# Patient Record
Sex: MALE | Race: BLACK OR AFRICAN AMERICAN | NOT HISPANIC OR LATINO | Employment: UNEMPLOYED | URBAN - METROPOLITAN AREA
[De-identification: names, ages, dates, MRNs, and addresses within clinical notes are randomized per-mention and may not be internally consistent; named-entity substitution may affect disease eponyms.]

---

## 2017-03-17 LAB — CRC RECOMMENDATION EXT: NORMAL

## 2018-05-01 ENCOUNTER — HISTORICAL (OUTPATIENT)
Dept: LAB | Facility: HOSPITAL | Age: 63
End: 2018-05-01

## 2018-05-01 LAB
ABS NEUT (OLG): 5.22 X10(3)/MCL (ref 2.1–9.2)
ALBUMIN SERPL-MCNC: 4.2 GM/DL (ref 3.4–5)
ALBUMIN/GLOB SERPL: 1.2 RATIO (ref 1.1–2)
ALP SERPL-CCNC: 64 UNIT/L (ref 50–136)
ALT SERPL-CCNC: 18 UNIT/L (ref 12–78)
AST SERPL-CCNC: 16 UNIT/L (ref 15–37)
BASOPHILS # BLD AUTO: 0.1 X10(3)/MCL (ref 0–0.2)
BASOPHILS NFR BLD AUTO: 1 %
BILIRUB SERPL-MCNC: 0.5 MG/DL (ref 0.2–1)
BILIRUBIN DIRECT+TOT PNL SERPL-MCNC: 0.1 MG/DL (ref 0–0.5)
BILIRUBIN DIRECT+TOT PNL SERPL-MCNC: 0.4 MG/DL (ref 0–0.8)
BUN SERPL-MCNC: 8 MG/DL (ref 7–18)
CALCIUM SERPL-MCNC: 9.1 MG/DL (ref 8.5–10.1)
CHLORIDE SERPL-SCNC: 102 MMOL/L (ref 98–107)
CHOLEST SERPL-MCNC: 184 MG/DL (ref 0–200)
CHOLEST/HDLC SERPL: 4.1 {RATIO} (ref 0–5)
CO2 SERPL-SCNC: 30 MMOL/L (ref 21–32)
CREAT SERPL-MCNC: 0.95 MG/DL (ref 0.7–1.3)
EOSINOPHIL # BLD AUTO: 0.5 X10(3)/MCL (ref 0–0.9)
EOSINOPHIL NFR BLD AUTO: 6 %
ERYTHROCYTE [DISTWIDTH] IN BLOOD BY AUTOMATED COUNT: 13 % (ref 11.5–17)
GLOBULIN SER-MCNC: 3.5 GM/DL (ref 2.4–3.5)
GLUCOSE SERPL-MCNC: 72 MG/DL (ref 74–106)
HCT VFR BLD AUTO: 48.3 % (ref 42–52)
HDLC SERPL-MCNC: 45 MG/DL (ref 35–60)
HGB BLD-MCNC: 15.7 GM/DL (ref 14–18)
LDLC SERPL CALC-MCNC: 109 MG/DL (ref 0–129)
LYMPHOCYTES # BLD AUTO: 2.8 X10(3)/MCL (ref 0.6–4.6)
LYMPHOCYTES NFR BLD AUTO: 29 %
MCH RBC QN AUTO: 33.1 PG (ref 27–31)
MCHC RBC AUTO-ENTMCNC: 32.5 GM/DL (ref 33–36)
MCV RBC AUTO: 101.9 FL (ref 80–94)
MONOCYTES # BLD AUTO: 0.7 X10(3)/MCL (ref 0.1–1.3)
MONOCYTES NFR BLD AUTO: 8 %
NEUTROPHILS # BLD AUTO: 5.22 X10(3)/MCL (ref 2.1–9.2)
NEUTROPHILS NFR BLD AUTO: 56 %
PLATELET # BLD AUTO: 264 X10(3)/MCL (ref 130–400)
PMV BLD AUTO: 10.1 FL (ref 9.4–12.4)
POTASSIUM SERPL-SCNC: 4.2 MMOL/L (ref 3.5–5.1)
PROT SERPL-MCNC: 7.7 GM/DL (ref 6.4–8.2)
RBC # BLD AUTO: 4.74 X10(6)/MCL (ref 4.7–6.1)
SODIUM SERPL-SCNC: 137 MMOL/L (ref 136–145)
TRIGL SERPL-MCNC: 148 MG/DL (ref 30–150)
VLDLC SERPL CALC-MCNC: 30 MG/DL
WBC # SPEC AUTO: 9.4 X10(3)/MCL (ref 4.5–11.5)

## 2018-06-07 ENCOUNTER — HISTORICAL (OUTPATIENT)
Dept: ADMINISTRATIVE | Facility: HOSPITAL | Age: 63
End: 2018-06-07

## 2018-06-07 LAB — POC CREATININE: 1.1 MG/DL (ref 0.6–1.3)

## 2018-11-24 ENCOUNTER — HISTORICAL (OUTPATIENT)
Dept: ADMINISTRATIVE | Facility: HOSPITAL | Age: 63
End: 2018-11-24

## 2018-11-27 ENCOUNTER — HISTORICAL (OUTPATIENT)
Dept: LAB | Facility: HOSPITAL | Age: 63
End: 2018-11-27

## 2018-11-27 LAB — FINAL CULTURE: NORMAL

## 2018-12-01 LAB — FINAL CULTURE: NORMAL

## 2019-01-02 ENCOUNTER — HOSPITAL ENCOUNTER (OUTPATIENT)
Dept: MEDSURG UNIT | Facility: HOSPITAL | Age: 64
End: 2019-01-04
Attending: INTERNAL MEDICINE | Admitting: INTERNAL MEDICINE

## 2019-01-02 LAB
ABS NEUT (OLG): 12.94 X10(3)/MCL (ref 2.1–9.2)
ALBUMIN SERPL-MCNC: 3.9 GM/DL (ref 3.4–5)
ALBUMIN/GLOB SERPL: 1 RATIO (ref 1–2)
ALP SERPL-CCNC: 54 UNIT/L (ref 45–117)
ALT SERPL-CCNC: 25 UNIT/L (ref 12–78)
AST SERPL-CCNC: 21 UNIT/L (ref 15–37)
BASOPHILS # BLD AUTO: 0.08 X10(3)/MCL
BASOPHILS NFR BLD AUTO: 0 %
BILIRUB SERPL-MCNC: 0.3 MG/DL (ref 0.2–1)
BILIRUBIN DIRECT+TOT PNL SERPL-MCNC: 0.1 MG/DL
BILIRUBIN DIRECT+TOT PNL SERPL-MCNC: 0.2 MG/DL
BUN SERPL-MCNC: 14 MG/DL (ref 7–18)
CALCIUM SERPL-MCNC: 8.7 MG/DL (ref 8.5–10.1)
CHLORIDE SERPL-SCNC: 102 MMOL/L (ref 98–107)
CO2 SERPL-SCNC: 29 MMOL/L (ref 21–32)
CREAT SERPL-MCNC: 1.2 MG/DL (ref 0.6–1.3)
EOSINOPHIL # BLD AUTO: 0.02 X10(3)/MCL
EOSINOPHIL NFR BLD AUTO: 0 %
ERYTHROCYTE [DISTWIDTH] IN BLOOD BY AUTOMATED COUNT: 13.6 % (ref 11.5–14.5)
EST. AVERAGE GLUCOSE BLD GHB EST-MCNC: 146 MG/DL
GLOBULIN SER-MCNC: 4 GM/ML (ref 2.3–3.5)
GLUCOSE SERPL-MCNC: 122 MG/DL (ref 74–106)
HBA1C MFR BLD: 6.7 % (ref 4.2–6.3)
HCT VFR BLD AUTO: 45.4 % (ref 40–51)
HGB BLD-MCNC: 15.3 GM/DL (ref 13.5–17.5)
IMM GRANULOCYTES # BLD AUTO: 0.06 10*3/UL
IMM GRANULOCYTES NFR BLD AUTO: 0 %
LYMPHOCYTES # BLD AUTO: 3.64 X10(3)/MCL
LYMPHOCYTES NFR BLD AUTO: 20 % (ref 13–40)
MAGNESIUM SERPL-MCNC: 2.2 MG/DL (ref 1.8–2.4)
MCH RBC QN AUTO: 33.7 PG (ref 26–34)
MCHC RBC AUTO-ENTMCNC: 33.7 GM/DL (ref 31–37)
MCV RBC AUTO: 100 FL (ref 80–100)
MONOCYTES # BLD AUTO: 1.46 X10(3)/MCL
MONOCYTES NFR BLD AUTO: 8 % (ref 4–12)
NEUTROPHILS # BLD AUTO: 12.94 X10(3)/MCL
NEUTROPHILS NFR BLD AUTO: 71 X10(3)/MCL
PLATELET # BLD AUTO: 259 X10(3)/MCL (ref 130–400)
PMV BLD AUTO: 9.9 FL (ref 7.4–10.4)
POTASSIUM SERPL-SCNC: 3.6 MMOL/L (ref 3.5–5.1)
PROT SERPL-MCNC: 7.9 GM/DL (ref 6.4–8.2)
RBC # BLD AUTO: 4.54 X10(6)/MCL (ref 4.5–5.9)
SODIUM SERPL-SCNC: 141 MMOL/L (ref 136–145)
WBC # SPEC AUTO: 18.2 X10(3)/MCL (ref 4.5–11)

## 2019-01-03 LAB
ABS NEUT (OLG): 9.53 X10(3)/MCL (ref 2.1–9.2)
ALBUMIN SERPL-MCNC: 3.3 GM/DL (ref 3.4–5)
ALBUMIN/GLOB SERPL: 1 RATIO (ref 1–2)
ALP SERPL-CCNC: 47 UNIT/L (ref 45–117)
ALT SERPL-CCNC: 20 UNIT/L (ref 12–78)
AST SERPL-CCNC: 23 UNIT/L (ref 15–37)
BASOPHILS # BLD AUTO: 0.07 X10(3)/MCL
BASOPHILS NFR BLD AUTO: 0 %
BILIRUB SERPL-MCNC: 0.4 MG/DL (ref 0.2–1)
BILIRUBIN DIRECT+TOT PNL SERPL-MCNC: 0.1 MG/DL
BILIRUBIN DIRECT+TOT PNL SERPL-MCNC: 0.3 MG/DL
BUN SERPL-MCNC: 14 MG/DL (ref 7–18)
CALCIUM SERPL-MCNC: 8.2 MG/DL (ref 8.5–10.1)
CHLORIDE SERPL-SCNC: 104 MMOL/L (ref 98–107)
CO2 SERPL-SCNC: 34 MMOL/L (ref 21–32)
CREAT SERPL-MCNC: 0.9 MG/DL (ref 0.6–1.3)
EOSINOPHIL # BLD AUTO: 0.02 10*3/UL
EOSINOPHIL NFR BLD AUTO: 0 %
ERYTHROCYTE [DISTWIDTH] IN BLOOD BY AUTOMATED COUNT: 14 % (ref 11.5–14.5)
GLOBULIN SER-MCNC: 3.6 GM/ML (ref 2.3–3.5)
GLUCOSE SERPL-MCNC: 98 MG/DL (ref 74–106)
HCT VFR BLD AUTO: 42.2 % (ref 40–51)
HGB BLD-MCNC: 13.7 GM/DL (ref 13.5–17.5)
IMM GRANULOCYTES # BLD AUTO: 0.06 10*3/UL
IMM GRANULOCYTES NFR BLD AUTO: 0 %
LYMPHOCYTES # BLD AUTO: 2.16 X10(3)/MCL
LYMPHOCYTES NFR BLD AUTO: 16 % (ref 13–40)
MCH RBC QN AUTO: 33.5 PG (ref 26–34)
MCHC RBC AUTO-ENTMCNC: 32.5 GM/DL (ref 31–37)
MCV RBC AUTO: 103.2 FL (ref 80–100)
MONOCYTES # BLD AUTO: 1.25 X10(3)/MCL
MONOCYTES NFR BLD AUTO: 10 % (ref 4–12)
NEUTROPHILS # BLD AUTO: 9.53 X10(3)/MCL
NEUTROPHILS NFR BLD AUTO: 73 X10(3)/MCL
PLATELET # BLD AUTO: 235 X10(3)/MCL (ref 130–400)
PMV BLD AUTO: 10.2 FL (ref 7.4–10.4)
POTASSIUM SERPL-SCNC: 3.4 MMOL/L (ref 3.5–5.1)
PROT SERPL-MCNC: 6.9 GM/DL (ref 6.4–8.2)
RBC # BLD AUTO: 4.09 X10(6)/MCL (ref 4.5–5.9)
SODIUM SERPL-SCNC: 143 MMOL/L (ref 136–145)
WBC # SPEC AUTO: 13.1 X10(3)/MCL (ref 4.5–11)

## 2019-01-04 LAB
ABS NEUT (OLG): 7.34 X10(3)/MCL (ref 2.1–9.2)
ALBUMIN SERPL-MCNC: 3.2 GM/DL (ref 3.4–5)
ALBUMIN/GLOB SERPL: 1 RATIO (ref 1–2)
ALP SERPL-CCNC: 47 UNIT/L (ref 45–117)
ALT SERPL-CCNC: 24 UNIT/L (ref 12–78)
AST SERPL-CCNC: 20 UNIT/L (ref 15–37)
BASOPHILS # BLD AUTO: 0.01 X10(3)/MCL
BASOPHILS NFR BLD AUTO: 0 %
BILIRUB SERPL-MCNC: 0.4 MG/DL (ref 0.2–1)
BILIRUBIN DIRECT+TOT PNL SERPL-MCNC: 0.2 MG/DL
BILIRUBIN DIRECT+TOT PNL SERPL-MCNC: 0.2 MG/DL
BUN SERPL-MCNC: 13 MG/DL (ref 7–18)
CALCIUM SERPL-MCNC: 8.4 MG/DL (ref 8.5–10.1)
CHLORIDE SERPL-SCNC: 102 MMOL/L (ref 98–107)
CO2 SERPL-SCNC: 36 MMOL/L (ref 21–32)
CREAT SERPL-MCNC: 0.9 MG/DL (ref 0.6–1.3)
ERYTHROCYTE [DISTWIDTH] IN BLOOD BY AUTOMATED COUNT: 13.6 % (ref 11.5–14.5)
GLOBULIN SER-MCNC: 3.6 GM/ML (ref 2.3–3.5)
GLUCOSE SERPL-MCNC: 93 MG/DL (ref 74–106)
HCT VFR BLD AUTO: 41 % (ref 40–51)
HGB BLD-MCNC: 13.5 GM/DL (ref 13.5–17.5)
IMM GRANULOCYTES # BLD AUTO: 0.05 10*3/UL
IMM GRANULOCYTES NFR BLD AUTO: 0 %
LYMPHOCYTES # BLD AUTO: 2.28 X10(3)/MCL
LYMPHOCYTES NFR BLD AUTO: 21 % (ref 13–40)
MCH RBC QN AUTO: 33.4 PG (ref 26–34)
MCHC RBC AUTO-ENTMCNC: 32.9 GM/DL (ref 31–37)
MCV RBC AUTO: 101.5 FL (ref 80–100)
MONOCYTES # BLD AUTO: 0.96 X10(3)/MCL
MONOCYTES NFR BLD AUTO: 9 % (ref 4–12)
NEUTROPHILS # BLD AUTO: 7.34 X10(3)/MCL
NEUTROPHILS NFR BLD AUTO: 69 X10(3)/MCL
PLATELET # BLD AUTO: 221 X10(3)/MCL (ref 130–400)
PMV BLD AUTO: 9.8 FL (ref 7.4–10.4)
POTASSIUM SERPL-SCNC: 3.5 MMOL/L (ref 3.5–5.1)
PROT SERPL-MCNC: 6.8 GM/DL (ref 6.4–8.2)
RBC # BLD AUTO: 4.04 X10(6)/MCL (ref 4.5–5.9)
SODIUM SERPL-SCNC: 142 MMOL/L (ref 136–145)
WBC # SPEC AUTO: 10.6 X10(3)/MCL (ref 4.5–11)

## 2019-04-24 ENCOUNTER — HISTORICAL (OUTPATIENT)
Dept: LAB | Facility: HOSPITAL | Age: 64
End: 2019-04-24

## 2019-04-24 LAB
APPEARANCE, UA: CLEAR
BACTERIA SPEC CULT: ABNORMAL /HPF
BILIRUB UR QL STRIP: NEGATIVE
COLOR UR: YELLOW
CREAT UR-MCNC: 150 MG/DL
GLUCOSE (UA): NEGATIVE
HGB UR QL STRIP: NEGATIVE
KETONES UR QL STRIP: NEGATIVE
LEUKOCYTE ESTERASE UR QL STRIP: ABNORMAL
MICROALBUMIN UR-MCNC: 0.6 MG/DL
MICROALBUMIN/CREAT RATIO PNL UR: 4 MG/GM CR (ref 0–30)
NITRITE UR QL STRIP: NEGATIVE
PH UR STRIP: 7 [PH] (ref 5–9)
PROT UR QL STRIP: NEGATIVE
RBC #/AREA URNS HPF: ABNORMAL /[HPF]
SP GR UR STRIP: 1.02 (ref 1–1.03)
SQUAMOUS EPITHELIAL, UA: ABNORMAL
UROBILINOGEN UR STRIP-ACNC: 1
WBC #/AREA URNS HPF: 10 /HPF (ref 0–3)

## 2019-04-27 LAB — FINAL CULTURE: NO GROWTH

## 2022-04-10 ENCOUNTER — HISTORICAL (OUTPATIENT)
Dept: ADMINISTRATIVE | Facility: HOSPITAL | Age: 67
End: 2022-04-10
Payer: MEDICARE

## 2022-04-29 NOTE — ED PROVIDER NOTES
Patient:   Jemal Mcelroy            MRN: [id number]            FIN: [id number]               Age:   63 years     Sex:  Male     :  1955   Associated Diagnoses:   COPD exacerbation   Author:   Yogesh Farias MD      Basic Information   Time seen: Date & time 2019 03:10:00.   History source: Patient.   Arrival mode: Ambulance.   History limitation: None.   Additional information: Chief Complaint from Nursing Triage Note : Chief Complaint   2019 3:07 CST        Chief Complaint           SOB. Seen in ED yesterday, same c/o.  .      History of Present Illness   The patient presents with difficulty breathing and wheezing.  The onset was 1  weeks ago.  The course/duration of symptoms is constant.  Degree at onset moderate.  Degree at present moderate.  The Exacerbating factors is smoke exposure.  The Relieving factors is rest.  Risk factors consist of chronic obstructive pulmonary disease.  Prior episodes: occasional.  Therapy today: beta-agonist Solu-medrol 125mg IV; Duoneb x 1; Albuterol x 1. and emergency medical services.  Associated symptoms: denies chest pain, denies fever, denies chills, denies nausea, denies vomiting and denies abdominal pain.        Review of Systems   Constitutional symptoms:  No fever, no chills.    Respiratory symptoms:  Shortness of breath, cough, No orthopnea,    Cardiovascular symptoms:  No chest pain, no palpitations.    Gastrointestinal symptoms:  No abdominal pain, no nausea, no vomiting.    Neurologic symptoms:  No headache, no dizziness.              Additional review of systems information: All other systems reviewed and otherwise negative.      Health Status   Allergies:    Allergic Reactions (Selected)  No Known Allergies,    Allergies (1) Active Reaction  No Known Allergies None Documented.   Medications:  (Selected)   Inpatient Medications  Ordered  DuoNeb: 9 mL, form: Soln, NEB, Now, first dose 19 3:16:00 CST, stop date 19 3:16:00 CST,  STAT  Prescriptions  Prescribed  Anoro Ellipta 62.5 mcg-25 mcg/inh inhalation powder: 1 puff(s), INH, Daily, # 30 EA, 5 Refill(s), [id number]  Flovent Diskus 250 mcg/inh inhalation powder: 2 puff(s), INH, BID, # 60 EA, 3 Refill(s), [id number]   mg oral tablet: See Instructions, TAKE 1 TABLET BY MOUTH 3 TIMES A DAY AS NEEDED FOR PAIN, # 60 tab(s), eRx: [facility]acy #[id number]  Lyrica 100 mg oral capsule: 100 mg = 1 cap(s), Oral, BID, # 60 cap(s), 2 Refill(s), [id number]  Zithromax Z-Woody 250 mg oral tablet: = 1 packet(s), Oral, As Directed, per package labeling, X 5 day(s), # 6 tab(s), 0 Refill(s)  albuterol 0.083% inhalation solution: 2.5 mg = 3 mL, INH, q6hr, PRN PRN for wheezing, # 75 EA, 11 Refill(s), [id number]  docusate sodium 100 mg oral tablet: 200 mg = 2 cap(s), Oral, Daily, with plenty of water, # 180 cap(s), 3 Refill(s), [id number]  enalapril-hydrochlorothiazide 10 mg-25 mg oral tablet: 1 tab(s), Oral, Daily, # 90 tab(s), 3 Refill(s), [id number]  loratadine 10 mg oral tablet: 10 mg = 1 tab(s), Oral, Daily, # 90 tab(s), 3 Refill(s), [id number]  metoprolol succinate 100 mg oral tablet extended release: 100 mg = 1 tab(s), Oral, Daily, # 90 tab(s), 3 Refill(s), Pharmacy: [facility] #[id number]  omeprazole 20 mg oral DR capsule: 20 mg, Oral, Daily, # 90 cap(s), 3 Refill(s), [id number]  prednisONE 20 mg oral tablet: 40 mg = 2 tab(s), Oral, Daily, with food or milk, X 5 day(s), # 10 tab(s), 0 Refill(s)  Documented Medications  Documented  Misc Prescription: CPAP, 0 Refill(s)  Misc Prescription: tumeric, 0 Refill(s)  Vitamin D3: 2000unit, Daily, 0 Refill(s)  aspirin 81 mg oral Delayed Release (EC) tablet: 81 mg = 1 tab(s), Oral, Daily, # 30 tab(s), 0 Refill(s)  cyanocobalamin 1000 mcg oral tablet: 1,000 mcg = 1 tab(s), Oral, Daily, # 90 tab(s), 0 Refill(s).      Past Medical/  Family/ Social History   Medical history:    Active  Chronic pain (338.2)  HTN (hypertension) ([id number])  Resolved  Headache ([id number]): Onset on 7/1/2012 at 56 years.  Resolved.  COPD (chronic obstructive pulmonary disease) (496):  Resolved.  Lung cancer (162.9):  Resolved.  Apnea, sleep ([id number]):  Resolved.  diabetes ([id number]):  Resolved., Reviewed as documented in chart.   Surgical history:    Colonoscopy ([id number]) on 3/17/2017 at 61 Years.  Esophagogastroduodenoscopy ([id number]) on 3/17/2017 at 61 Years.  Esophagogastroduodenoscopy on 6/17/2014 at 58 Years.  Comments:  6/17/2014 15:24 - Zara Yoder RN  auto-populated from documented surgical case  Biopsy Gastrointestional on 6/17/2014 at 58 Years.  Comments:  6/17/2014 15:24 - Zara Yoder RN  auto-populated from documented surgical case  Lobectomy of lung ([id number]).  Comments:  10/31/2015 14:26 - Caryn GHOTRA, Vanessa LEVINE  Right Upper Lobe  left breast cyst removal., Reviewed as documented in chart.   Family history:    No family history items have been selected or recorded., Reviewed as documented in chart.   Social history: Reviewed as documented in chart.   Problem list:    Active Problems (10)  Chronic pain   Deficient knowledge   History of lobectomy of lung   History of lung cancer   HTN (hypertension)   Obesity   Rectal bleeding   Stage 4 very severe COPD by GOLD classification   Tobacco user   Tremor .      Physical Examination               Vital Signs   Vital Signs   1/2/2019 3:07 CST        Temperature Oral          36.7 DegC                             Temperature Oral (calculated)             98.06 DegF                             Peripheral Pulse Rate     108 bpm  HI                             Respiratory Rate          32 br/min  HI                             SpO2                      95 %                             Oxygen Therapy            Nasal cannula                              Oxygen Flow Rate          4 L/min                             Systolic Blood Pressure   155 mmHg  HI                             Diastolic Blood Pressure  80 mmHg  .      Vital Signs (last 24 hrs)_____  Last Charted___________  Temp Oral     36.7 DegC  (JAN 02 03:07)  Heart Rate Peripheral   H 108bpm  (JAN 02 03:07)  Resp Rate         H 32br/min  (JAN 02 03:07)  SBP      H 155mmHg  (JAN 02 03:07)  DBP      80 mmHg  (JAN 02 03:07)  SpO2      95 %  (JAN 02 03:07).   Measurements   1/2/2019 3:07 CST        Weight Dosing             105 kg                             Weight Measured and Calculated in Lbs     231.48 lb                             Weight Estimated          105 kg  .   Basic Oxygen Information   1/2/2019 3:07 CST        SpO2                      95 %                             Oxygen Therapy            Nasal cannula                             Oxygen Flow Rate          4 L/min  .   General:  Alert, no acute distress.    Skin:  Intact, moist.    Head:  Normocephalic, atraumatic.    Neck:  Supple, trachea midline, no tenderness.    Eye:  Pupils are equal, round and reactive to light, extraocular movements are intact, normal conjunctiva.    Cardiovascular:  Regular rate and rhythm, No murmur, Normal peripheral perfusion.    Respiratory:  Breath sounds are equal, Symmetrical chest wall expansion, Respirations: Tachypneic, Breath sounds: Wheezes present (moderate, expiratory wheezes).    Gastrointestinal:  Soft, Nontender, Non distended, Normal bowel sounds.    Neurological:  Alert and oriented to person, place, time, and situation, No focal neurological deficit observed.    Psychiatric:  Cooperative, appropriate mood & affect.       Medical Decision Making   Documents reviewed:  Emergency department nurses' notes.   Electrocardiogram:  Time 1/2/2019 03:24:00, rate 98, normal sinus rhythm, No ST-T changes, no ectopy, normal NV & QRS intervals, EP Interp.    Results review:  Lab results : Lab View    1/2/2019 3:20 CST        Sodium Lvl                141 mmol/L                             Potassium Lvl             3.6 mmol/L                             Chloride                  102 mmol/L                             CO2                       29 mmol/L                             Calcium Lvl               8.7 mg/dL                             Glucose Lvl               122 mg/dL  HI                             BUN                       14 mg/dL                             Creatinine                1.20 mg/dL                             eGFR-AA                   79 mL/min  LOW                             eGFR-DAMIEN                  65 mL/min  LOW                             Bili Total                0.3 mg/dL                             Bili Direct               0.1 mg/dL                             Bili Indirect             0.2 mg/dL                             AST                       21 unit/L                             ALT                       25 unit/L                             Alk Phos                  54 unit/L                             Total Protein             7.9 gm/dL                             Albumin Lvl               3.9 gm/dL                             Globulin                  4.00 gm/mL  HI                             A/G Ratio                 1 ratio                             WBC                       18.2 x10(3)/mcL  HI                             RBC                       4.54 x10(6)/mcL                             Hgb                       15.3 gm/dL                             Hct                       45.4 %                             Platelet                  259 x10(3)/mcL                             MCV                       100.0 fL                             MCH                       33.7 pg                             MCHC                      33.7 gm/dL                             RDW                       13.6 %                             MPV                       9.9 fL                              Abs Neut                  12.94 x10(3)/mcL  HI                             Neutro Auto               71 x10(3)/mcL  NA                             Lymph Auto                20 %                             Mono Auto                 8 %                             Eos Auto                  0  NA                             Abs Eos                   0.02 x10(3)/mcL  NA                             Basophil Auto             0  NA                             Abs Neutro                12.94 x10(3)/mcL  NA                             Abs Lymph                 3.64 x10(3)/mcL  NA                             Abs Mono                  1.46 x10(3)/mcL  NA                             Abs Baso                  0.08 x10(3)/mcL  NA                             IG%                       0 %  NA                             IG#                       0.0600  NA  .   Chest X-Ray:  Time reported 1/2/2019 04:04:00, no acute disease process, interpretation by Emergency Physician.       Reexamination/ Reevaluation   Time: 1/2/2019 04:32:00 .   Notes: Patient is currently feeling improved.  Patient is saturating 96 and 98% on 4 L of oxygen.  On oxygen, and patient observed for 10-15 minutes, oxygen saturation ranges between 85 and 91%.  Due to persistent hypoxia and continued symptoms despite 2 days of home therapy with oral antibiotics, internal medicine has been consulted for admission.  Patient denies fever or chills.  He is nontoxic appearing.  Patient has leukocytosis with no left shift, likely from steroid treatment.  Discused with medicine on call and they will decide if they will begin antibx..      Impression and Plan   Diagnosis   COPD exacerbation ([id number] J44.1)      Calls-Consults   -  1/2/2019 04:30:00 , Internal Medicine, consult.    Plan   Condition: Improved, Stable.    Disposition: Admit time  1/2/2019 04:32:00, Place in Observation Telemetry Unit.    Counseled: Patient, Family, Regarding  diagnosis, Regarding diagnostic results, Regarding treatment plan, Regarding prescription, Patient indicated understanding of instructions.

## 2022-04-29 NOTE — DISCHARGE SUMMARY
Patient:   Jemal Mcelroy            MRN: [id number]            FIN: [id number]               Age:   63 years     Sex:  Male     :  1955   Associated Diagnoses:   COPD exacerbation   Author:   Suman JOHN, Hermila MOSHER      Discharge Information      Discharge Summary Information   Admit/Discharge Dates   Admit Date: 2019  Discharge Date: 2019   Physicians   Attending Physician - Keren JOHN, Sukhi Kelley  Admitting Physician - Keren JOHN, Sukhi Kelley  Consulting Physician - Keren JOHN, Sukhi Kelley  Consulting Physician - Michelle JOHN, Brian HENDERSON  Primary Care Physician - Maria Elena JOHN, Angelica LAUGHLIN   Discharge Diagnosis   Chronic obstructive pulmonary disease with (acute) exacerbation (J44.1)       Hospital Course   Hospital Course   63-year-old -American male with COPD, tobacco use, hypertension, diabetes type II, acid reflux and intention tremors brought into the ED via EMS due to acute shortness of breath. Shortness of breath began at bedtime on yesterday with associated dizziness, lightheadedness, diffuse chest discomfort, nausea and chills. He also experienced 1 episode of vomiting after eating on yesterday. Wife and bedside stated they recently had to family members over for the holidays smoke. Patient believes his shortness of breath worsened due to family member smoking in the home and smoke from cooking in the home due to the holidays. Wife and patient smoke indoors. Has also had ongoing cough productive of yellowish sputum for the last several weeks; total of half a cup of sputum. Not currently on home oxygen. Recently seen in the ED on 2018 due to cough; was discharged on steroids and a Z-Woody. Patient also mentioned he's had ongoing bloody stools for the last 3 years. Reports blood mainly is in the toilet more so than on his stool;had a workup with GI regarding this matter but is unsure of what is causing bloody stool. Prior to arrival in the ER he was given 1 dose of Solu-Medrol 125  mg and to duo nebs. In the ER he was given duo nebs ×3, labs significant for elevated white count of 18,000 without shift, chest x-ray final read pending. Medicine on-call consulted for COPD exacerbation. Patient admitted to telemetry for COPD exacerbation. He was treated with SoluMedrol 125mg IV daily, DuoNebs q4h and Levaquin 750mg daily. The patient improved and on Day 3 walking O2 sat done with PT.  Patient's O2 sat 84% walking with PT and 88% at rest with no O2. With 2L O2 patient's O2 sat >90%. Home O2 ordered for [facility] and he was approved. Patient was discharged with home O2 2L as well as Levaquin. Instructed to continue home inhalers. Patient treated with Lovenox for DVT prophylaxis..              Physical Examination   Vital Signs   1/4/2019 7:41 CST        Peripheral Pulse Rate     90 bpm                             Respiratory Rate          18 br/min                             FIO2                      28 %                             SpO2                      97 %                             Oxygen Therapy            Nasal cannula                             Oxygen Flow Rate          2 L/min    1/4/2019 7:00 CST        Temperature Oral          36.7 DegC                             Temperature Oral (calculated)             98.06 DegF                             Systolic Blood Pressure   140 mmHg                             Diastolic Blood Pressure  91 mmHg  HI                             Mean Arterial Pressure, Cuff              107 mmHg     General:  Alert and oriented, No acute distress.    Eye:  Extraocular movements are intact.    HENT:  Normocephalic, Normal hearing, Oral mucosa is moist.    Respiratory:  Respirations are non-labored, Breath sounds are equal, Symmetrical chest wall expansion, Mild wheezing in lower lung fields and prolonged expiratory phase, exam much improved from yesterday.    Cardiovascular:  Normal rate, Regular rhythm, No murmur.    Musculoskeletal:  Normal range of motion.     Integumentary:  Warm, Dry.    Neurologic:  No focal deficits.    Psychiatric:  Cooperative, Appropriate mood & affect.       Results Review   General results   Most recent results   All   1/4/2019 6:25 CST        WBC                       10.6 x10(3)/mcL                             RBC                       4.04 x10(6)/mcL  LOW                             Hgb                       13.5 gm/dL                             Hct                       41.0 %                             Platelet                  221 x10(3)/mcL                             MCV                       101.5 fL  HI                             MCH                       33.4 pg                             MCHC                      32.9 gm/dL                             RDW                       13.6 %                             MPV                       9.8 fL                             Abs Neut                  7.34 x10(3)/mcL                             Neutro Auto               69 x10(3)/mcL  NA                             Lymph Auto                21 %                             Mono Auto                 9 %                             Basophil Auto             0  NA                             Abs Neutro                7.34 x10(3)/mcL  NA                             Abs Lymph                 2.28 x10(3)/mcL  NA                             Abs Mono                  0.96 x10(3)/mcL  NA                             Abs Baso                  0.01 x10(3)/mcL  NA                             IG%                       0 %  NA                             IG#                       0.0500  NA                             Sodium Lvl                142 mmol/L                             Potassium Lvl             3.5 mmol/L                             Chloride                  102 mmol/L                             CO2                       36 mmol/L  HI                             Calcium Lvl               8.4 mg/dL  LOW                              Glucose Lvl               93 mg/dL                             BUN                       13 mg/dL                             Creatinine                0.90 mg/dL                             eGFR-AA                   >105 mL/min                             eGFR-DAMIEN                  91 mL/min                             Bili Total                0.4 mg/dL                             Bili Direct               0.2 mg/dL                             Bili Indirect             0.2 mg/dL                             AST                       20 unit/L                             ALT                       24 unit/L                             Alk Phos                  47 unit/L                             Total Protein             6.8 gm/dL                             Albumin Lvl               3.2 gm/dL  LOW                             Globulin                  3.60 gm/mL  HI                             A/G Ratio                 1 ratio           Discharge Plan   Discharge Summary Plan   Discharge disposition: discharge to home self care.     Prescriptions: reviewed with patient, written and given to patient.     Orders     Orders (Selected)   Prescriptions  Prescribed  Anoro Ellipta 62.5 mcg-25 mcg/inh inhalation powder: 1 puff(s), INH, Daily, # 30 EA, 0 Refill(s)  Flovent Diskus 250 mcg/inh inhalation powder: 2 puff(s), INH, BID, # 60 EA, 0 Refill(s)  Levaquin 750 mg oral tablet: 750 mg = 1 tab(s), Oral, q24hr, X 7 day(s), # 7 tab(s), 0 Refill(s)  albuterol 0.083% inhalation solution: 2.5 mg = 3 mL, INH, q6hr, PRN PRN for wheezing, # 75 EA, 0 Refill(s)  prednisONE 20 mg oral tablet: 40 mg = 2 tab(s), Oral, Daily, with food or milk, X 5 day(s), # 10 tab(s), 0 Refill(s).        Diagnosis     COPD exacerbation ([id number] J44.1).     Course   Improving.     Education and Follow-up   Counseled: patient, regarding diagnosis, regarding treatment, regarding medications.     Discharge Planning: Diabetes Mellitus and Standards of  Medical Care, Diabetes Mellitus and Skin Care, Diabetes Mellitus and Sick Day Management, Diabetes Mellitus and Food, Hypertension, Chronic Obstructive Pulmonary Disease Exacerbation, Easy-to-Read, Angelica Arredondo Within 3 to 5 days Follow-up with PCP in 3 to 5 days; Anytime the conditions worsen, return to clinic or go to ED.

## 2022-04-30 VITALS
WEIGHT: 218.25 LBS | HEIGHT: 69 IN | BODY MASS INDEX: 28.28 KG/M2 | DIASTOLIC BLOOD PRESSURE: 80 MMHG | OXYGEN SATURATION: 95 % | SYSTOLIC BLOOD PRESSURE: 117 MMHG | SYSTOLIC BLOOD PRESSURE: 131 MMHG | HEIGHT: 69 IN | WEIGHT: 190.94 LBS | BODY MASS INDEX: 32.33 KG/M2 | DIASTOLIC BLOOD PRESSURE: 89 MMHG

## 2022-05-02 NOTE — HISTORICAL [facility] CERNER
This is a historical note converted from [employer]. Formatting and pictures may have been removed.  Please reference [employer]ad for original formatting and attached multimedia. Chief Complaint  SOB. Seen in ED yesterday, same c/o.  History of Present Illness  63-year-old -American male with COPD,?tobacco use, hypertension,?diabetes type II, acid reflux and intention?tremors?brought into the ED via EMS due to acute?shortness of breath.? Shortness of breath began at bedtime on yesterday?with associated dizziness, lightheadedness, diffuse chest discomfort, nausea?and chills.??He also experienced 1 episode of vomiting after eating on yesterday.? Wife and bedside stated they recently had to family members over for the holidays?smoke. ?Patient believes?his shortness of breath worsened?due to family member smoking in the home?and smoke from cooking in the home due to the holidays. ?Wife and patient smoke indoors. ?Has also had?ongoing?cough?productive of yellowish sputum?for the last several weeks; total of?half a cup?of sputum.? Not currently on home oxygen. ?Recently seen in the ED on 12/31/2018 due to?cough; was discharged on?steroids and a Z-Woody. ?Patient also mentioned?hes had ongoing?bloody stools for the last 3 years.? Reports blood mainly is in the toilet more so than on his stool;had a?workup?with GI?regarding this matter?but is unsure?of what is causing?bloody stool.? Prior to arrival in the ER he was given?1 dose of Solu-Medrol 125 mg?and to duo nebs. ?In the ER he was given?duo nebs ?3,?labs significant for elevated?white count of 18,000?without shift,?chest x-ray?final read pending.? Medicine on-call consulted for COPD exacerbation.  Review of Systems  Negative except HPI  Physical Exam  Vitals & Measurements  T:?36.6? ?C (Oral)? TMIN:?36.6? ?C (Oral)? TMAX:?36.7? ?C (Oral)? HR:?102(Monitored)? RR:?24? BP:?129/75? SpO2:?95%? WT:?105?kg? WT:?105?kg?  Appearance: alert,?mild?acute distress, not ill  appearing,  Skin: skin normal color and temperature. No pallor, rashes, or lesions.??  Heart: RRR,?no murmurs or rubs noted, 2+ radial pulses  Lungs: Diffuse B/L?expiratory wheezing?with diminished breath sounds?posteriorly,?bilateral chest rise and fall  Head: Atraumatic  Eyes: PERRLA, EOMI, No pallor or icterus  Ears: no hearing abnormalities  Throat: oral mucosa moist  Neck: no JVD, no carotid bruit  Abdomen: BS normal, nontender, distended, no masses  MSK: Normal ROM, no erythema or tenderness  Neurologic: No gross focal neurological deficits  Psychiatric: Cooperative  Assessment/Plan  COPD exacerbation  -Continue Solu-Medrol 125 mg?daily  -Duo nebs every 4?scheduled  -Normal saline at 150 ML/HR  -Started Levaquin 750 mg every 24 hours  -Respiratory culture pending.  -Peak flows?Q8 hours?via respiratory therapy  -Oxygen via nasal cannula to keep?O2 saturations?between 88-92%  ?  Chronic conditions:  Hypertension  -Restarted home dose of metoprolol and?HCTZ-lisinopril  Tobacco use  DM type II  -Last hemoglobin A1c was 6.4?(2015);HbA1c6.7  -Started Low ISS  Intention tremors  Acid reflux  ?  Prophylaxis: Lovenox, Protonix  Diet:?Low sodium with diabetic modification  CODE STATUS: Full code  Disposition: Admitted to remote telemetry unit for?COPD exacerbation,?continue duo nebs and Solu-Medrol daily.? Peak flows were ordered?every 8 hours; respiratory culture pending.?Continue?antibiotic therapy?for respiratory coverage.? Hemoglobin A1c 6.7, started TRACI. ?Restarted home blood pressure medications;?holding off on Lyrica.  ?  Mireya Stevens MD  \[facility]\"" Family Medicine resident, -1?  ?   Problem List/Past Medical History  Ongoing  Chronic pain  History of lobectomy of lung  History of lung cancer  HTN (hypertension)  Obesity  Rectal bleeding  Stage 4 very severe COPD by GOLD classification  Tobacco user  Tremor  Historical  Apnea, sleep  COPD (chronic obstructive pulmonary disease)  diabetes  Headache  Lung  cancer  Procedure/Surgical History  Colonoscopy (03/17/2017)  Esophagogastroduodenoscopy (03/17/2017)  Biopsy Gastrointestional (06/17/2014)  Esophagogastroduodenoscopy (06/17/2014)  left breast cyst removal  Lobectomy of lung   Medications  Inpatient  aspirin, 81 mg= 1 tab(s), Oral, Daily  DuoNeb 0.5 mg-2.5 mg/3 mL inhalation solution, 3 mL, NEB, q6hr Resp  hydrochlorothiazide-lisinopril 12.5 mg-10 mg oral tablet, 1 tab(s), Oral, Daily  IVF Normal Saline NS Infusion 1,000 mL, 1000 mL, IV  levofloxacin IVPB ( Levaquin ), 750 mg= 150 mL, IV Piggyback, q24hr  Lovenox, 40 mg= 0.4 mL, Subcutaneous, Daily  Magnesium Sulfate 2gm/50ml IVPB (Premix), 2 gm= 50 mL, IV Piggyback, Now  metoprolol succinate 100 mg oral tablet, extended release, 100 mg, Oral, Daily  Protonix, 40 mg= 1 EA, IV Slow, Daily  SOLU-Medrol, 125 mg= 2 mL, IV Push, Daily  Zofran  Zofran, 4 mg= 2 mL, IV Push, q4hr, PRN  Home  albuterol 0.083% inhalation solution, 2.5 mg= 3 mL, INH, q6hr, PRN, 11 refills  Anoro Ellipta 62.5 mcg-25 mcg/inh inhalation powder, 1 puff(s), INH, Daily, 5 refills  aspirin 81 mg oral Delayed Release (EC) tablet, 81 mg= 1 tab(s), Oral, Daily  cyanocobalamin 1000 mcg oral tablet, 1000 mcg= 1 tab(s), Oral, Daily  docusate sodium 100 mg oral tablet, 200 mg= 2 cap(s), Oral, Daily, 3 refills  enalapril-hydrochlorothiazide 10 mg-25 mg oral tablet, 1 tab(s), Oral, Daily, 3 refills   mg oral tablet, See Instructions  loratadine 10 mg oral tablet, 10 mg= 1 tab(s), Oral, Daily, 3 refills  Lyrica 100 mg oral capsule, 100 mg= 1 cap(s), Oral, BID, 2 refills  metoprolol succinate 100 mg oral tablet extended release, 100 mg= 1 tab(s), Oral, Daily, 3 refills  Misc Prescription, tumeric  omeprazole 20 mg oral DR capsule, 20 mg, Oral, Daily, 3 refills  prednisONE 20 mg oral tablet, 40 mg= 2 tab(s), Oral, Daily  Vitamin D3, 2000unit, Daily  Zithromax Z-Woody 250 mg oral tablet, 1 packet(s), Oral, As Directed  Allergies  No Known Allergies  Social  History  Alcohol - Denies Alcohol Use, 07/01/2012  Current, 1-2 times per year, 06/26/2016  Exercise  Home/Environment - No Risk, 06/17/2014  Lives with Spouse. Living situation: Home/Independent. Home equipment: Respiratory treatments, Walker/Cane., 06/17/2014  Nutrition/Health  Regular, 06/17/2014  Sexual  Sexually active: Yes., 05/01/2018  Substance Abuse - Denies Substance Abuse, 07/01/2012  Current, Marijuana, 1-2 times per week, 06/26/2016  Tobacco - High Risk, 06/17/2014  10 or more cigarettes (1/2 pack or more)/day in last 30 days, Cigarettes, No, 01/02/2019  Immunizations  Vaccine Date Status   influenza virus vaccine, inactivated 11/26/2018 Given   pneumococcal 23-polyvalent vaccine 11/01/2015 Given   Lab Results  Labs Last 24 Hours?  ?Chemistry? Hematology/Coagulation?   Sodium Lvl: 141 mmol/L (01/02/19 03:20:01) WBC:?18.2 x10(3)/mcL?High (01/02/19 03:20:01)   Potassium Lvl: 3.6 mmol/L (01/02/19 03:20:01) RBC: 4.54 x10(6)/mcL (01/02/19 03:20:01)   Chloride: 102 mmol/L (01/02/19 03:20:01) Hgb: 15.3 gm/dL (01/02/19 03:20:01)   CO2: 29 mmol/L (01/02/19 03:20:01) Hct: 45.4 % (01/02/19 03:20:01)   Calcium Lvl: 8.7 mg/dL (01/02/19 03:20:01) Platelet: 259 x10(3)/mcL (01/02/19 03:20:01)   Magnesium Lvl: 2.2 mg/dL (01/02/19 03:20:00) MCV: 100 fL (01/02/19 03:20:01)   Glucose Lvl:?122 mg/dL?High (01/02/19 03:20:01) MCH: 33.7 pg (01/02/19 03:20:01)   EAG:?146 mg/dL?High (01/02/19 03:20:00) MCHC: 33.7 gm/dL (01/02/19 03:20:01)   BUN: 14 mg/dL (01/02/19 03:20:01) RDW: 13.6 % (01/02/19 03:20:01)   Creatinine: 1.2 mg/dL (01/02/19 03:20:01) MPV: 9.9 fL (01/02/19 03:20:01)   eGFR-AA:?79 mL/min?Low (01/02/19 03:20:01) Abs Neut:?12.94 x10(3)/mcL?High (01/02/19 03:20:01)   eGFR-DAMIEN:?65 mL/min?Low (01/02/19 03:20:01) Neutro Auto: 71 x10(3)/mcL (01/02/19 03:20:01)   Bili Total: 0.3 mg/dL (01/02/19 03:20:01) Lymph Auto: 20 % (01/02/19 03:20:01)   Bili Direct: 0.1 mg/dL (01/02/19 03:20:01) Mono Auto: 8 % (01/02/19 03:20:01)    Bili Indirect: 0.2 mg/dL (01/02/19 03:20:01) Eos Auto: 0 (01/02/19 03:20:01)   AST: 21 unit/L (01/02/19 03:20:01) Abs Eos: 0.02 x10(3)/mcL (01/02/19 03:20:01)   ALT: 25 unit/L (01/02/19 03:20:01) Basophil Auto: 0 (01/02/19 03:20:01)   Alk Phos: 54 unit/L (01/02/19 03:20:01) Abs Neutro: 12.94 x10(3)/mcL (01/02/19 03:20:01)   Total Protein: 7.9 gm/dL (01/02/19 03:20:01) Abs Lymph: 3.64 x10(3)/mcL (01/02/19 03:20:01)   Albumin Lvl: 3.9 gm/dL (01/02/19 03:20:01) Abs Mono: 1.46 x10(3)/mcL (01/02/19 03:20:01)   Globulin:?4 gm/mL?High (01/02/19 03:20:01) Abs Baso: 0.08 x10(3)/mcL (01/02/19 03:20:01)   A/G Ratio: 1 ratio (01/02/19 03:20:01) IG%: 0 % (01/02/19 03:20:01)   Hgb A1c:?6.7 %?High (01/02/19 03:20:00) IG#: 0.06 (01/02/19 03:20:01)   ?  ?  Diagnostic Results  ?CXR pending      This is a 63-year-old male with hx of severe COPD (stage IV, FEV1 29%), ANTONY on CPAP, s/o lobectomy (2005) 2/2 mesothelioma, tobacco use, T2DM, HTN who presented with worsening SOB and wheezing x 1 week. Reports has been exacerbated by smoking and second hand smoke around him especially over the holidays. Endorses associated productive cough with yellow sputum as well as intermittent chest pain associated with cough. Denies fever. Patient recently seen and discharged from ED on 12/31 with COPD exacerbation and given a course of prednisone as well as azithromycin. Patient was given Duonebs x 3 in the ED and 125 mg IV Solumedrol. O2 sats were in the upper 90s on 4L nasal cannula. CXR with bilateral hazy opacifications compared with previous exam (per my read). Physical exam significant for bilateral end expiratory wheezing throughout (worse at the lung bases) and moderate abdominal distention without fluid wave. Patient does not appear to be in any acute distress. Patient admitted for COPD exacerbation. Will start on Duonebs q4h, Levaquin 750 mg qd. Peak flow q8h ordered. Respiratory culture obtained. Will resume home BP meds. Hgba1c ordered,  SSI ordered.?  ?   Care transitioned to Dr. Will.  Patient seen this morning. He is a 63yoM with hx of mesothelioma s/p lobectomy, ANTONY, and COPD stage IV. He states he initially came to the ED due to SOB and chest tightness. The patient admits to continued mild SOB and chest tightness. He also admits to cough productive yellow/white sputum. Patient received Solu-Medrol 125mg IV and 2gm of Mg in ED. He states he was previously seen in the ED on 12/31 and given steroids. He felt much better for approximately 1 day but symptoms worsened. The patient also admits to exertional dyspnea and intermittent LE edema. He has ANTONY and is currently on CPAP. On exam he was found to have prolonged expiratory phase with wheezing in lower lobes and decreased breath sounds. Continue nasal cannula with O2 goal of 88-94%.? Continue Levaquin and Duonebs. Will follow up respiratory cultures that was ordered. Continue peak flow. Will order Echo to rule out underlying CHF. Patient has leukocytosis however likely to multiple doses of steroids he received previously. He also is newly diagnosed DM will start low sliding scale. Will follow up respiratory cultures.  Hermila Will MD?HO-III   I have seen the patient with the residents, re assessed the historical, physical, laboratory, and radiologic findings.  I have discussed the case with the residents and made recommendations.

## 2022-05-02 NOTE — HISTORICAL [facility] CERNER
This is a historical note converted from [employer]ner. Formatting and pictures may have been removed.  Please reference [employer]ner for original formatting and attached multimedia. Chief Complaint  Patient is c/o boil on the upper back with drainage x 1week  History of Present Illness  63-year-old black male?with a one-week history of draining?abscess to?left upper back. ?No significant swelling, redness,?or pain. ?Denies fever or chills.  Patients wife has been applying triple antibiotic ointment?and cleaning the area with antibacterial soap?and water.  No prior history of MRSA reported.  Review of Systems  GENERAL: Negative except as stated?in HPI  CV: Negative except as stated in HPI  RESP: Negative except as stated?in HPI  GI: Negative?except as stated in?HPI  : Negative?except as stated in?HPI  SKIN: Negative?except as stated in?HPI  Neuro: Negative?except as stated in?HPI  MS: Negative?except as stated in?HPI  Psych: Negative?except as stated in?HPI  Physical Exam  Vitals & Measurements  T:?36.7? ?C (Oral)? HR:?73(Peripheral)? BP:?131/89?  HT:?175?cm? HT:?175?cm? WT:?99?kg? WT:?99?kg? BMI:?32.33?  Vital Signs reviewed  GENERAL: Awake and alert; no acute distress.  CV: Normal rate and rhythm.? Normal peripheral pulses and perfusion.  RESP: Respirations even and nonlabored. BBS clear to auscultation.  MUSCULOSKELETAL: Full passive and active ROM of all joints. No bony deformities,?joint tenderness or swelling.?Normal gait.  NEURO: Awake, alert and oriented to person, place, time and situation. Normal speech pattern. ?No focal deficits noted.  INTEGUMENTARY: 2 cm area of mild erythema to left upper back. No induration. Small amount of purulent drainage noted.  PSYCH: Appropriate mood and affect; cooperative.  Assessment/Plan  1.?Skin abscess?L02.91  Wound culture obtained. Rx for?Clindamycin 150 mg?every 6 hours ?7 days. ?Keeping area clean and dry; clean with antibacterial soap. ?Moist warm compresses to the area 3-4  times a day. ?Return to clinic as needed. ?To ER for severe pain, increased redness or swelling, fever.  ?  Orders:  clindamycin, 150 mg = 1 cap(s), Oral, q6hr, X 7 day(s), # 28 cap(s), 0 Refill(s), Pharmacy: [facility]/pharmacy #[id number]   Problem List/Past Medical History  Ongoing  Chronic pain  History of lobectomy of lung  History of lung cancer  HTN (hypertension)  Obesity  Rectal bleeding  Stage 4 very severe COPD by GOLD classification  Tobacco user  Tremor  Historical  Apnea, sleep  COPD (chronic obstructive pulmonary disease)  diabetes  Headache  Lung cancer  Procedure/Surgical History  Colonoscopy (03/17/2017)  Esophagogastroduodenoscopy (03/17/2017)  Biopsy Gastrointestional (06/17/2014)  Esophagogastroduodenoscopy (06/17/2014)  left breast cyst removal  Lobectomy of lung   Medications  albuterol 0.083% inhalation solution, 2.5 mg= 3 mL, INH, q6hr, PRN, 11 refills  Anoro Ellipta 62.5 mcg-25 mcg/inh inhalation powder, 1 puff(s), INH, Daily, 5 refills  aspirin 81 mg oral Delayed Release (EC) tablet, 81 mg= 1 tab(s), Oral, Daily  clindamycin 150 mg oral capsule, 150 mg= 1 cap(s), Oral, q6hr  cyanocobalamin 1000 mcg oral tablet, 1000 mcg= 1 tab(s), Oral, Daily  docusate sodium 100 mg oral tablet, 200 mg= 2 cap(s), Oral, Daily, 3 refills  enalapril-hydrochlorothiazide 10 mg-25 mg oral tablet, 1 tab(s), Oral, Daily, 3 refills  ibuprofen 800 mg oral tablet, 800 mg= 1 tab(s), Oral, TID, PRN  loratadine 10 mg oral tablet, 10 mg= 1 tab(s), Oral, Daily, 3 refills  Lyrica 100 mg oral capsule, 100 mg= 1 cap(s), Oral, BID, 2 refills  LYRICA 75 MG CAPSULE  metoprolol succinate 100 mg oral tablet extended release, 100 mg= 1 tab(s), Oral, Daily, 3 refills  Misc Prescription, tumeric  omeprazole 20 mg oral DR capsule, 20 mg, Oral, Daily, 3 refills  Toradol, 30 mg= 1 mL, IM, Once  Vitamin D3, 2000unit, Daily  Allergies  No Known Allergies  Social History  Alcohol - Denies Alcohol Use, 07/01/2012  Current, 1-2 times per year,  06/26/2016  Exercise  Home/Environment - No Risk, 06/17/2014  Lives with Spouse. Living situation: Home/Independent. Home equipment: Respiratory treatments, Walker/Cane., 06/17/2014  Nutrition/Health  Regular, 06/17/2014  Sexual  Sexually active: Yes., 05/01/2018  Substance Abuse - Denies Substance Abuse, 07/01/2012  Current, Marijuana, 1-2 times per week, 06/26/2016  Tobacco - High Risk, 06/17/2014  Current every day smoker, Yes, 11/24/2018  Current every day smoker, 01/18/2018  Immunizations  Vaccine Date Status   pneumococcal 23-polyvalent vaccine 11/01/2015 Given   Health Maintenance  Health Maintenance  ???Pending?(in the next year)  ??? ??OverDue  ??? ? ? ?COPD Maintenance-Spirometry due??and every?  ??? ??Due?  ??? ? ? ?ADL Screening due??11/24/18??and every 1??year(s)  ??? ? ? ?COPD Maintenance-Pulmonary Rehab Education due??11/24/18??and every 1??year(s)  ??? ? ? ?Influenza Vaccine due??11/24/18??and every?  ??? ? ? ?Lung Cancer Screening due??11/24/18??and every 1??year(s)  ??? ? ? ?Tetanus Vaccine due??11/24/18??and every 10??year(s)  ??? ? ? ?Zoster Vaccine due??11/24/18??and every 100??year(s)  ??? ??Due In Future?  ??? ? ? ?Alcohol Misuse Screening not due until??05/01/19??and every 1??year(s)  ??? ? ? ?Smoking Cessation not due until??06/12/19??and every 1??year(s)  ??? ? ? ?COPD Management-COPD Medications Prescribed not due until??08/27/19??and every 1??year(s)  ??? ? ? ?Hypertension Management-BMP not due until??11/02/19??and every 1??year(s)  ??? ? ? ?Aspirin Therapy for CVD Prevention not due until??11/02/19??and every 1??year(s)  ??? ? ? ?COPD Management-Oxygen Assessment not due until??11/02/19??and every 1??year(s)  ???Satisfied?(in the past 1 year)  ??? ??Satisfied?  ??? ? ? ?Alcohol Misuse Screening on??05/01/18.??Satisfied by Tram Gonzalez LPN  ??? ? ? ?Aspirin Therapy for CVD Prevention on??11/02/18.??Satisfied by Olivia GHOTRA, Radha  ??? ? ? ?Blood Pressure Screening  on??11/24/18.??Satisfied by Jillian Aguilar MA  ??? ? ? ?Body Mass Index Check on??11/24/18.??Satisfied by Jillian Aguilar MA  ??? ? ? ?COPD Maintenance-Spirometry on??06/07/18.??Satisfied by Jolanta Vogel  ??? ? ? ?COPD Management-COPD Medications Prescribed on??08/27/18.??Satisfied by Angelica Arredondo MD  ??? ? ? ?COPD Management-Oxygen Assessment on??11/02/18.??Satisfied by Radha Baker RN  ??? ? ? ?Colorectal Screening on??03/17/18.??Satisfied by Nereida Burdick  ??? ? ? ?Depression Screening on??11/24/18.??Satisfied by Jillian Aguilar MA  ??? ? ? ?Diabetes Maintenance-Fasting Lipid Profile on??05/01/18.??Satisfied by Yeison Dudley  ??? ? ? ?Diabetes Screening on??11/02/18.??Satisfied by Ryan Rose Jr.  ??? ? ? ?Hypertension Management-Blood Pressure on??11/24/18.??Satisfied by Jillian Aguilar MA  ??? ? ? ?Hypertension Management-BMP on??11/02/18.??Satisfied by Ryan Rose Jr.  ??? ? ? ?Influenza Vaccine on??11/24/18.??Satisfied by Jillian Aguilar MA  ??? ? ? ?Lipid Screening on??05/01/18.??Satisfied by Yeison Dudley  ??? ? ? ?Obesity Screening on??11/24/18.??Satisfied by Jillian Aguilar MA  ??? ? ? ?Smoking Cessation on??06/12/18.??Satisfied by Tram Gonzalez LPN  ??? ? ? ?Smoking Cessation (Coronary Artery Disease) on??06/12/18.??Satisfied by Tram Gonzalez LPN  ?  ?

## 2023-01-30 ENCOUNTER — DOCUMENTATION ONLY (OUTPATIENT)
Dept: ADMINISTRATIVE | Facility: HOSPITAL | Age: 68
End: 2023-01-30
Payer: MEDICARE

## 2023-02-06 ENCOUNTER — HOSPITAL ENCOUNTER (EMERGENCY)
Facility: HOSPITAL | Age: 68
Discharge: HOME | End: 2023-02-06
Attending: STUDENT IN AN ORGANIZED HEALTH CARE EDUCATION/TRAINING PROGRAM
Payer: MEDICARE

## 2023-02-06 VITALS
BODY MASS INDEX: 30.92 KG/M2 | WEIGHT: 208.75 LBS | TEMPERATURE: 99 F | HEART RATE: 99 BPM | DIASTOLIC BLOOD PRESSURE: 80 MMHG | SYSTOLIC BLOOD PRESSURE: 136 MMHG | HEIGHT: 69 IN | RESPIRATION RATE: 20 BRPM | OXYGEN SATURATION: 98 %

## 2023-02-06 DIAGNOSIS — J44.9 COPD (CHRONIC OBSTRUCTIVE PULMONARY DISEASE): ICD-10-CM

## 2023-02-06 DIAGNOSIS — R05.3 CHRONIC COUGH: Primary | ICD-10-CM

## 2023-02-06 LAB
FLUAV AG UPPER RESP QL IA.RAPID: NOT DETECTED
FLUBV AG UPPER RESP QL IA.RAPID: NOT DETECTED
SARS-COV-2 RNA RESP QL NAA+PROBE: NOT DETECTED
STREP A PCR (OHS): NOT DETECTED

## 2023-02-06 PROCEDURE — 99284 EMERGENCY DEPT VISIT MOD MDM: CPT | Mod: 25

## 2023-02-06 PROCEDURE — 0240U COVID/FLU A&B PCR: CPT | Performed by: NURSE PRACTITIONER

## 2023-02-06 PROCEDURE — 25000242 PHARM REV CODE 250 ALT 637 W/ HCPCS: Performed by: NURSE PRACTITIONER

## 2023-02-06 PROCEDURE — 63600175 PHARM REV CODE 636 W HCPCS: Performed by: NURSE PRACTITIONER

## 2023-02-06 PROCEDURE — 87651 STREP A DNA AMP PROBE: CPT | Performed by: NURSE PRACTITIONER

## 2023-02-06 PROCEDURE — 94640 AIRWAY INHALATION TREATMENT: CPT

## 2023-02-06 RX ORDER — DOXYCYCLINE 100 MG/1
100 CAPSULE ORAL EVERY 12 HOURS
Qty: 20 CAPSULE | Refills: 0 | Status: SHIPPED | OUTPATIENT
Start: 2023-02-06 | End: 2023-02-16

## 2023-02-06 RX ORDER — PREDNISONE 20 MG/1
TABLET ORAL
Qty: 6 TABLET | Refills: 0 | OUTPATIENT
Start: 2023-02-06 | End: 2023-05-15

## 2023-02-06 RX ORDER — IPRATROPIUM BROMIDE AND ALBUTEROL SULFATE 2.5; .5 MG/3ML; MG/3ML
9 SOLUTION RESPIRATORY (INHALATION)
Status: COMPLETED | OUTPATIENT
Start: 2023-02-06 | End: 2023-02-06

## 2023-02-06 RX ORDER — ALBUTEROL SULFATE 90 UG/1
1-2 AEROSOL, METERED RESPIRATORY (INHALATION) EVERY 6 HOURS PRN
Qty: 1 G | Refills: 0 | Status: SHIPPED | OUTPATIENT
Start: 2023-02-06 | End: 2023-03-08

## 2023-02-06 RX ORDER — PREDNISONE 10 MG/1
40 TABLET ORAL
Status: COMPLETED | OUTPATIENT
Start: 2023-02-06 | End: 2023-02-06

## 2023-02-06 RX ADMIN — PREDNISONE 40 MG: 10 TABLET ORAL at 09:02

## 2023-02-06 RX ADMIN — IPRATROPIUM BROMIDE AND ALBUTEROL SULFATE 9 ML: .5; 3 SOLUTION RESPIRATORY (INHALATION) at 08:02

## 2023-02-07 NOTE — ED PROVIDER NOTES
"Encounter Date: 2/6/2023       History     Chief Complaint   Patient presents with    Shortness of Breath     Shortness of breath x2 days. Received Prednisone from urgent care "not helping". Hx of COPD and lung lobectomy due to CA.     The patient presents with increased shortness of breath and coughing.  The onset was 1 week ago.  The course/duration of symptoms is fluctuating in intensity.  Degree at onset mild.  Degree at present mild.  The Relieving factors is none.  Risk factors consist of copd, lobectomy, smoker.  Prior episodes: occasional.  Therapy today: beta-agonist nebulizer, he is also on home O2 at 2L/M.  Associated symptoms: denies chest pain, denies fever, denies chills, denies nausea, denies vomiting and denies abdominal pain. Additional history: he was seen yesterday at an urgent care, he was prescribed prednisone 20mg bid for 3 days and cough medicine, he has a pcp appointment in 1 week on 2/13. He is out of his albuterol inhaler and requests refill.    Review of patient's allergies indicates:  No Known Allergies  Past Medical History:   Diagnosis Date    Cancer     COPD (chronic obstructive pulmonary disease)     Hypertension     Personal history of colonic polyps      Past Surgical History:   Procedure Laterality Date    COLONOSCOPY  03/17/2017     History reviewed. No pertinent family history.  Social History     Tobacco Use    Smoking status: Every Day     Packs/day: 0.50     Types: Cigarettes    Smokeless tobacco: Never   Substance Use Topics    Drug use: Yes     Frequency: 2.0 times per week     Types: Marijuana     Review of Systems   Constitutional:  Negative for fever.   HENT:  Negative for sore throat.    Respiratory:  Positive for cough and shortness of breath.    Cardiovascular:  Negative for chest pain.   Gastrointestinal:  Negative for nausea.   Genitourinary:  Negative for dysuria.   Musculoskeletal:  Negative for back pain.   Skin:  Negative for rash.   Neurological:  Negative for " weakness.   Hematological:  Does not bruise/bleed easily.   All other systems reviewed and are negative.    Physical Exam     Initial Vitals [02/06/23 1846]   BP Pulse Resp Temp SpO2   134/79 103 (!) 24 98.6 °F (37 °C) 96 %      MAP       --         Physical Exam    Nursing note and vitals reviewed.  Constitutional: He appears well-developed and well-nourished.   HENT:   Head: Normocephalic and atraumatic.   Neck: Neck supple.   Normal range of motion.  Cardiovascular:  Normal rate, regular rhythm, normal heart sounds and intact distal pulses.           Pulmonary/Chest: Effort normal. He has decreased breath sounds in the right lower field and the left lower field. He has no wheezes.   Abdominal: Abdomen is soft. Bowel sounds are normal.   Musculoskeletal:         General: Normal range of motion.      Cervical back: Normal range of motion and neck supple.     Neurological: He is alert and oriented to person, place, and time. He has normal strength.   Skin: Skin is warm and dry.   Psychiatric: He has a normal mood and affect.       ED Course   Procedures  Labs Reviewed   STREP GROUP A BY PCR - Normal    Narrative:     The Xpert Xpress Strep A test is a rapid, qualitative in vitro diagnostic test for the detection of Streptococcus pyogenes (Group A ß-hemolytic Streptococcus, Strep A) in throat swab specimens from patients with signs and symptoms of pharyngitis.     COVID/FLU A&B PCR - Normal    Narrative:     The Xpert Xpress SARS-CoV-2/FLU/RSV plus is a rapid, multiplexed real-time PCR test intended for the simultaneous qualitative detection and differentiation of SARS-CoV-2, Influenza A, Influenza B, and respiratory syncytial virus (RSV) viral RNA in either nasopharyngeal swab or nasal swab specimens.                Imaging Results              X-Ray Chest AP Portable (Final result)  Result time 02/06/23 19:39:56      Final result by Jaciel Granados MD (02/06/23 19:39:56)                   Impression:       Findings of chronic lung disease which appears stable.      Electronically signed by: Jaciel Granados  Date:    02/06/2023  Time:    19:39               Narrative:    EXAMINATION:  XR CHEST AP PORTABLE    CLINICAL HISTORY:  Chronic obstructive pulmonary disease, unspecified    TECHNIQUE:  Single view of the chest    COMPARISON:  07/02/2021    FINDINGS:  Prominent interstitial markings with no focal opacification.  No pleural effusion or pneumothorax.    The cardiomediastinal silhouette is within normal limits.    No acute osseous abnormality.                                       Medications   albuterol-ipratropium 2.5 mg-0.5 mg/3 mL nebulizer solution 9 mL (9 mLs Nebulization Given 2/6/23 2002)   predniSONE tablet 40 mg (40 mg Oral Given 2/6/23 2108)     Medical Decision Making:   History:   Old Records Summarized: records from clinic visits and records from previous admission(s).  Clinical Tests:   Radiological Study: Ordered and Reviewed  Re-eval at 2030: feels better after resp tx and wishes to go home, BBS clear, resp even/nonlabored, will add 3 days to currently prescribed prednisone to make total of 6 days of 40mg qd, he has a pcp appointment on 2/13 in 1 week, strict return to ER instructions given    8:43 PM DISPOSITION: The patient is resting comfortably in no acute distress.  He is hemodynamically stable and is without objective evidence for acute process requiring urgent intervention or hospitalization. I provided counseling to patient with regard to condition, the treatment plan, specific conditions for return, and the importance of follow up. Detailed written and verbal instructions provided to patient and he expressed a verbal understanding of the discharge instructions and overall management plan. Reiterated the importance of medication administration and safety and advised patient to follow up with primary care provider in 1 week at scheduled appointment or sooner if needed.  Answered questions at  this time. The patient is stable for discharge.        APC / Resident Notes:   I was not physically present during the history, exam or disposition of this patient. I was available at all times for consultation. (Daniel)                   Clinical Impression:   Final diagnoses:  [J44.9] COPD (chronic obstructive pulmonary disease)  [R05.3] Chronic cough (Primary)        ED Disposition Condition    Discharge Stable          ED Prescriptions       Medication Sig Dispense Start Date End Date Auth. Provider    albuterol (PROVENTIL/VENTOLIN HFA) 90 mcg/actuation inhaler Inhale 1-2 puffs into the lungs every 6 (six) hours as needed for Wheezing. Rescue 1 g 2/6/2023 3/8/2023 IRWIN Mcnair    doxycycline (VIBRAMYCIN) 100 MG Cap Take 1 capsule (100 mg total) by mouth every 12 (twelve) hours. for 10 days 20 capsule 2/6/2023 2/16/2023 IRWIN Mcnair    predniSONE (DELTASONE) 20 MG tablet Continue taking prednisone 20mg tab twice a day, with the addition of this prescription will make total of 6 days 6 tablet 2/6/2023 -- IRWIN Mcnair          Follow-up Information       Follow up With Specialties Details Why Contact Info    follow up at scheduled pcp appointment next week on 2/13        [facility] Emergency Medicine  If symptoms worsen [address] [facility] [address] [phone]             IRWIN Mcnair  02/06/23 2046       Crow Sanchez MD  02/08/23 4134

## 2023-03-15 DIAGNOSIS — Z87.891 HISTORY OF TOBACCO USE: Primary | ICD-10-CM

## 2023-03-20 ENCOUNTER — HOSPITAL ENCOUNTER (EMERGENCY)
Facility: HOSPITAL | Age: 68
Discharge: HOME | End: 2023-03-20
Attending: EMERGENCY MEDICINE
Payer: MEDICARE

## 2023-03-20 VITALS
SYSTOLIC BLOOD PRESSURE: 145 MMHG | RESPIRATION RATE: 16 BRPM | DIASTOLIC BLOOD PRESSURE: 85 MMHG | WEIGHT: 208.31 LBS | BODY MASS INDEX: 30.85 KG/M2 | HEART RATE: 78 BPM | HEIGHT: 69 IN | TEMPERATURE: 99 F | OXYGEN SATURATION: 98 %

## 2023-03-20 DIAGNOSIS — M54.2 NECK PAIN: ICD-10-CM
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
## 2023-03-20 PROCEDURE — 99284 EMERGENCY DEPT VISIT MOD MDM: CPT

## 2023-03-20 RX ORDER — DICLOFENAC SODIUM 75 MG/1
75 TABLET, DELAYED RELEASE ORAL 2 TIMES DAILY PRN
Qty: 20 TABLET | Refills: 0 | OUTPATIENT
Start: 2023-03-20 | End: 2023-05-15

## 2023-03-20 RX ORDER — TIZANIDINE 4 MG/1
4 TABLET ORAL EVERY 6 HOURS PRN
Qty: 20 TABLET | Refills: 0 | OUTPATIENT
Start: 2023-03-20 | End: 2023-05-15
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
## 2023-03-21 ENCOUNTER — TELEPHONE (OUTPATIENT)
Dept: EMERGENCY MEDICINE | Facility: HOSPITAL | Age: 68
End: 2023-03-21
Payer: MEDICARE

## 2023-03-21 NOTE — TELEPHONE ENCOUNTER
Patient contacted states he's feeling good medication prescribed picked up and is helping.  Instructed to contact PCP for follow up appointment.

## 2023-03-28 ENCOUNTER — HOSPITAL ENCOUNTER (OUTPATIENT)
Dept: RADIOLOGY | Facility: HOSPITAL | Age: 68
Discharge: HOME | End: 2023-03-28
Attending: NURSE PRACTITIONER
Payer: MEDICARE

## 2023-03-28 DIAGNOSIS — Z87.891 HISTORY OF TOBACCO USE: ICD-10-CM

## 2023-03-28 PROCEDURE — 71271 CT THORAX LUNG CANCER SCR C-: CPT | Mod: TC

## 2023-05-14 ENCOUNTER — HOSPITAL ENCOUNTER (EMERGENCY)
Facility: HOSPITAL | Age: 68
Discharge: HOME | End: 2023-05-15
Attending: EMERGENCY MEDICINE
Payer: OTHER GOVERNMENT

## 2023-05-14 DIAGNOSIS — I95.1 ORTHOSTATIC HYPOTENSION: Primary | ICD-10-CM

## 2023-05-14 DIAGNOSIS — I95.9 HYPOTENSION: ICD-10-CM

## 2023-05-14 PROCEDURE — 83880 ASSAY OF NATRIURETIC PEPTIDE: CPT | Performed by: EMERGENCY MEDICINE

## 2023-05-14 PROCEDURE — 80053 COMPREHEN METABOLIC PANEL: CPT | Performed by: EMERGENCY MEDICINE

## 2023-05-14 PROCEDURE — 84484 ASSAY OF TROPONIN QUANT: CPT | Performed by: EMERGENCY MEDICINE

## 2023-05-14 PROCEDURE — 83735 ASSAY OF MAGNESIUM: CPT | Performed by: EMERGENCY MEDICINE

## 2023-05-14 PROCEDURE — 85025 COMPLETE CBC W/AUTO DIFF WBC: CPT | Performed by: EMERGENCY MEDICINE

## 2023-05-14 PROCEDURE — 93005 ELECTROCARDIOGRAM TRACING: CPT

## 2023-05-14 PROCEDURE — 99285 EMERGENCY DEPT VISIT HI MDM: CPT | Mod: 25

## 2023-05-15 VITALS
WEIGHT: 213.19 LBS | RESPIRATION RATE: 15 BRPM | HEIGHT: 69 IN | OXYGEN SATURATION: 93 % | BODY MASS INDEX: 31.58 KG/M2 | SYSTOLIC BLOOD PRESSURE: 130 MMHG | DIASTOLIC BLOOD PRESSURE: 83 MMHG | HEART RATE: 64 BPM | TEMPERATURE: 98 F

## 2023-05-15 LAB
ALBUMIN SERPL-MCNC: 3.9 G/DL (ref 3.4–4.8)
ALBUMIN/GLOB SERPL: 1.3 RATIO (ref 1.1–2)
ALP SERPL-CCNC: 52 UNIT/L (ref 40–150)
ALT SERPL-CCNC: 23 UNIT/L (ref 0–55)
APPEARANCE UR: CLEAR
AST SERPL-CCNC: 22 UNIT/L (ref 5–34)
BACTERIA #/AREA URNS AUTO: NORMAL /HPF
BASOPHILS # BLD AUTO: 0.13 X10(3)/MCL
BASOPHILS NFR BLD AUTO: 1.2 %
BILIRUB UR QL STRIP.AUTO: NEGATIVE MG/DL
BILIRUBIN DIRECT+TOT PNL SERPL-MCNC: 0.3 MG/DL
BNP BLD-MCNC: <10 PG/ML
BUN SERPL-MCNC: 12.4 MG/DL (ref 8.4–25.7)
CALCIUM SERPL-MCNC: 9.4 MG/DL (ref 8.8–10)
CHLORIDE SERPL-SCNC: 104 MMOL/L (ref 98–107)
CO2 SERPL-SCNC: 27 MMOL/L (ref 23–31)
COLOR UR: YELLOW
CREAT SERPL-MCNC: 1.15 MG/DL (ref 0.73–1.18)
EOSINOPHIL # BLD AUTO: 0.45 X10(3)/MCL (ref 0–0.9)
EOSINOPHIL NFR BLD AUTO: 4.3 %
ERYTHROCYTE [DISTWIDTH] IN BLOOD BY AUTOMATED COUNT: 13.2 % (ref 11.5–17)
GFR SERPLBLD CREATININE-BSD FMLA CKD-EPI: >60 MLS/MIN/1.73/M2
GLOBULIN SER-MCNC: 3.1 GM/DL (ref 2.4–3.5)
GLUCOSE SERPL-MCNC: 88 MG/DL (ref 82–115)
GLUCOSE UR QL STRIP.AUTO: NEGATIVE MG/DL
HCT VFR BLD AUTO: 46.9 % (ref 42–52)
HGB BLD-MCNC: 15.7 G/DL (ref 14–18)
IMM GRANULOCYTES # BLD AUTO: 0.03 X10(3)/MCL (ref 0–0.04)
IMM GRANULOCYTES NFR BLD AUTO: 0.3 %
KETONES UR QL STRIP.AUTO: NEGATIVE MG/DL
LEUKOCYTE ESTERASE UR QL STRIP.AUTO: NEGATIVE UNIT/L
LYMPHOCYTES # BLD AUTO: 2.94 X10(3)/MCL (ref 0.6–4.6)
LYMPHOCYTES NFR BLD AUTO: 28.2 %
MAGNESIUM SERPL-MCNC: 1.8 MG/DL (ref 1.6–2.6)
MCH RBC QN AUTO: 35.1 PG (ref 27–31)
MCHC RBC AUTO-ENTMCNC: 33.5 G/DL (ref 33–36)
MCV RBC AUTO: 104.9 FL (ref 80–94)
MONOCYTES # BLD AUTO: 0.86 X10(3)/MCL (ref 0.1–1.3)
MONOCYTES NFR BLD AUTO: 8.2 %
NEUTROPHILS # BLD AUTO: 6.03 X10(3)/MCL (ref 2.1–9.2)
NEUTROPHILS NFR BLD AUTO: 57.8 %
NITRITE UR QL STRIP.AUTO: NEGATIVE
NRBC BLD AUTO-RTO: 0 %
PH UR STRIP.AUTO: 5.5 [PH]
PLATELET # BLD AUTO: 212 X10(3)/MCL (ref 130–400)
PMV BLD AUTO: 9.8 FL (ref 7.4–10.4)
POTASSIUM SERPL-SCNC: 4.4 MMOL/L (ref 3.5–5.1)
PROT SERPL-MCNC: 7 GM/DL (ref 5.8–7.6)
PROT UR QL STRIP.AUTO: NEGATIVE MG/DL
RBC # BLD AUTO: 4.47 X10(6)/MCL (ref 4.7–6.1)
RBC #/AREA URNS AUTO: NORMAL /HPF
RBC UR QL AUTO: ABNORMAL UNIT/L
SODIUM SERPL-SCNC: 140 MMOL/L (ref 136–145)
SP GR UR STRIP.AUTO: 1.01 (ref 1–1.03)
SQUAMOUS #/AREA URNS AUTO: NORMAL /HPF
TROPONIN I SERPL-MCNC: <0.01 NG/ML (ref 0–0.04)
UROBILINOGEN UR STRIP-ACNC: 0.2 MG/DL
WBC # SPEC AUTO: 10.44 X10(3)/MCL (ref 4.5–11.5)
WBC #/AREA URNS AUTO: NORMAL /HPF

## 2023-05-15 PROCEDURE — 63600175 PHARM REV CODE 636 W HCPCS: Performed by: EMERGENCY MEDICINE

## 2023-05-15 PROCEDURE — 96360 HYDRATION IV INFUSION INIT: CPT

## 2023-05-15 PROCEDURE — 94640 AIRWAY INHALATION TREATMENT: CPT

## 2023-05-15 PROCEDURE — 94761 N-INVAS EAR/PLS OXIMETRY MLT: CPT

## 2023-05-15 PROCEDURE — 81001 URINALYSIS AUTO W/SCOPE: CPT | Performed by: EMERGENCY MEDICINE

## 2023-05-15 PROCEDURE — 25000242 PHARM REV CODE 250 ALT 637 W/ HCPCS: Performed by: EMERGENCY MEDICINE

## 2023-05-15 RX ORDER — GABAPENTIN 300 MG/1
300 CAPSULE ORAL 3 TIMES DAILY
COMMUNITY

## 2023-05-15 RX ORDER — METOPROLOL SUCCINATE 100 MG/1
100 TABLET, EXTENDED RELEASE ORAL DAILY
COMMUNITY
End: 2023-05-15 | Stop reason: SDUPTHER

## 2023-05-15 RX ORDER — ALBUTEROL SULFATE 0.83 MG/ML
2.5 SOLUTION RESPIRATORY (INHALATION)
Status: COMPLETED | OUTPATIENT
Start: 2023-05-15 | End: 2023-05-15

## 2023-05-15 RX ORDER — METOPROLOL SUCCINATE 50 MG/1
50 TABLET, EXTENDED RELEASE ORAL DAILY
Qty: 30 TABLET | Refills: 0 | Status: SHIPPED | OUTPATIENT
Start: 2023-05-15 | End: 2023-06-14

## 2023-05-15 RX ORDER — ATORVASTATIN CALCIUM 40 MG/1
40 TABLET, FILM COATED ORAL DAILY
COMMUNITY

## 2023-05-15 RX ADMIN — ALBUTEROL SULFATE 2.5 MG: 2.5 SOLUTION RESPIRATORY (INHALATION) at 01:05

## 2023-05-15 RX ADMIN — SODIUM CHLORIDE, POTASSIUM CHLORIDE, SODIUM LACTATE AND CALCIUM CHLORIDE 1000 ML: 600; 310; 30; 20 INJECTION, SOLUTION INTRAVENOUS at 12:05

## 2023-05-15 NOTE — ED PROVIDER NOTES
Encounter Date: 5/14/2023    SCRIBE #1 NOTE: I, Cachorro Burkett, am scribing for, and in the presence of,  Tung Priutt III, MD. I have scribed the following portions of the note - Other sections scribed: HPI, ROS, PE.     History     Chief Complaint   Patient presents with    Hypotension     Patient complaint of intermittent hypotension, headaches, dizziness.  Symptoms started Friday.      67 year old male with pmhx of COPD, HTN, and cancer presents to ED c/o dizziness and headache onset Friday. Pt reports that his dizziness is resent whenever he gets up form a laying or sitting position, denies history of similar symptoms. Pt reports that he has an intermittent dull feeling headache at the front of his head. Per wife, pt had blood pressure of 87/53 after he stood up just PTA, history of COPD, taking 4 breathing treatments per day. Pt reports that he smokes 1 pack per day currently.    The history is provided by the patient. No  was used.   Headache   This is a new problem. Episode onset: friday. The problem occurs intermittently. The problem has been unchanged. The pain is located in the Frontal region. The quality of the pain is described as dull. Associated symptoms include dizziness. Pertinent negatives include no abdominal pain, back pain, coughing, eye pain, fever, nausea, neck pain, rhinorrhea or vomiting.   Review of patient's allergies indicates:   Allergen Reactions    Metformin Diarrhea and Other (See Comments)     Other reaction(s): Other     Past Medical History:   Diagnosis Date    Cancer     COPD (chronic obstructive pulmonary disease)     Hypertension     Personal history of colonic polyps      Past Surgical History:   Procedure Laterality Date    COLONOSCOPY  03/17/2017     No family history on file.  Social History     Tobacco Use    Smoking status: Every Day     Packs/day: 0.50     Types: Cigarettes    Smokeless tobacco: Never   Substance Use Topics    Drug use: Yes      Frequency: 2.0 times per week     Types: Marijuana     Review of Systems   Constitutional:  Negative for fatigue, fever and unexpected weight change.   HENT:  Negative for congestion and rhinorrhea.    Eyes:  Negative for pain.   Respiratory:  Negative for cough, chest tightness, shortness of breath and wheezing.    Cardiovascular:  Negative for chest pain.   Gastrointestinal:  Negative for abdominal pain, constipation, diarrhea, nausea and vomiting.   Genitourinary:  Negative for dysuria.   Musculoskeletal:  Negative for back pain and neck pain.   Skin:  Negative for rash.   Allergic/Immunologic: Negative for environmental allergies, food allergies and immunocompromised state.   Neurological:  Positive for dizziness and headaches. Negative for speech difficulty.   Hematological:  Does not bruise/bleed easily.   Psychiatric/Behavioral:  Negative for sleep disturbance and suicidal ideas.      Physical Exam     Initial Vitals [05/14/23 2320]   BP Pulse Resp Temp SpO2   103/69 75 18 97.9 °F (36.6 °C) 95 %      MAP       --         Physical Exam    Nursing note and vitals reviewed.  Constitutional: He appears well-developed and well-nourished.   Smell of tobacco on breath   HENT:   Head: Normocephalic and atraumatic.   Eyes: EOM are normal. Pupils are equal, round, and reactive to light.   Neck:   Normal range of motion.  Cardiovascular:  Normal rate, regular rhythm, normal heart sounds and intact distal pulses.           Pulmonary/Chest: Breath sounds normal.   Abdominal: Abdomen is soft. Bowel sounds are normal.   Musculoskeletal:         General: Normal range of motion.      Cervical back: Normal range of motion.     Neurological: He is alert and oriented to person, place, and time. He has normal strength. GCS score is 15. GCS eye subscore is 4. GCS verbal subscore is 5. GCS motor subscore is 6.   Skin: Skin is warm and dry. Capillary refill takes less than 2 seconds.   Psychiatric: He has a normal mood and affect.  Judgment normal.       ED Course   Procedures  Labs Reviewed   URINALYSIS, REFLEX TO URINE CULTURE - Abnormal; Notable for the following components:       Result Value    Blood, UA Trace (*)     All other components within normal limits   CBC WITH DIFFERENTIAL - Abnormal; Notable for the following components:    RBC 4.47 (*)     .9 (*)     MCH 35.1 (*)     All other components within normal limits   B-TYPE NATRIURETIC PEPTIDE - Normal   TROPONIN I - Normal   MAGNESIUM - Normal   URINALYSIS, MICROSCOPIC - Normal   CBC W/ AUTO DIFFERENTIAL    Narrative:     The following orders were created for panel order CBC auto differential.  Procedure                               Abnormality         Status                     ---------                               -----------         ------                     CBC with Differential[[id number]]        Abnormal            Final result                 Please view results for these tests on the individual orders.   COMPREHENSIVE METABOLIC PANEL          Imaging Results              X-Ray Chest 1 View (In process)                      Medications   albuterol nebulizer solution 2.5 mg (2.5 mg Nebulization Given 5/15/23 0104)   lactated ringers bolus 1,000 mL (0 mLs Intravenous Stopped 5/15/23 0150)              Scribe Attestation:   Scribe #1: I performed the above scribed service and the documentation accurately describes the services I performed. I attest to the accuracy of the note.    Attending Attestation:           Physician Attestation for Scribe:  Physician Attestation Statement for Scribe #1: I, Tung Pruitt III, MD, reviewed documentation, as scribed by Cachorro Burkett in my presence, and it is both accurate and complete.       Medical Decision Making  Differential diagnosis includes but not limited to anemia renal dysfunction over-medication electrolyte disturbance    No melena no diarrhea CBC normal anemia no longer differential normal electrolytes patient  complaining of weakness and low blood pressure when he gets up in his wife did check his blood pressure at home his pressure was 87 symptoms get better as he lays down patient no recent blood pressure changes.  Patient given small bolus IV fluids orthostatics negative here will adjust blood pressure medications patient without complaints will be discharged    Problems Addressed:  Hypotension: complicated acute illness or injury that poses a threat to life or bodily functions  Orthostatic hypotension: acute illness or injury    Amount and/or Complexity of Data Reviewed  Independent Historian: spouse     Details: Per wife, pt pmhx of COPD and HTN, 4 breathing treatments per day, blood pressure 87/53 after standing up just PTA  Labs: ordered.  Radiology: ordered and independent interpretation performed.           ED Course as of 05/15/23 0240   Mon May 15, 2023   0026 Patient with weakness and dizziness and mild headache only when standing up will get orthostatics check electrolytes assess fluid status neurologically intact [FK]      ED Course User Index  [FK] Tung Pruitt III, MD                 Clinical Impression:   Final diagnoses:  [I95.9] Hypotension  [I95.1] Orthostatic hypotension (Primary)        ED Disposition Condition    Discharge Stable          ED Prescriptions       Medication Sig Dispense Start Date End Date Auth. Provider    metoprolol succinate (TOPROL-XL) 50 MG 24 hr tablet Take 1 tablet (50 mg total) by mouth once daily. 30 tablet 5/15/2023 6/14/2023 Tung Pruitt III, MD          Follow-up Information       Follow up With Specialties Details Why Contact Info    [facility]  In 1 week  [address] [place] [address]  [phone]               Tung Pruitt III, MD  05/15/23 6591

## 2023-06-19 ENCOUNTER — HOSPITAL ENCOUNTER (EMERGENCY)
Facility: HOSPITAL | Age: 68
Discharge: HOME | End: 2023-06-19
Attending: STUDENT IN AN ORGANIZED HEALTH CARE EDUCATION/TRAINING PROGRAM
Payer: MEDICARE

## 2023-06-19 VITALS
DIASTOLIC BLOOD PRESSURE: 81 MMHG | WEIGHT: 212.06 LBS | BODY MASS INDEX: 31.41 KG/M2 | HEIGHT: 69 IN | OXYGEN SATURATION: 96 % | RESPIRATION RATE: 17 BRPM | HEART RATE: 85 BPM | SYSTOLIC BLOOD PRESSURE: 139 MMHG | TEMPERATURE: 98 F

## 2023-06-19 DIAGNOSIS — J44.1 COPD WITH ACUTE EXACERBATION: Primary | ICD-10-CM

## 2023-06-19 DIAGNOSIS — R06.02 SOB (SHORTNESS OF BREATH): ICD-10-CM

## 2023-06-19 DIAGNOSIS — R05.9 COUGH: ICD-10-CM

## 2023-06-19 DIAGNOSIS — J20.9 ACUTE BRONCHITIS, UNSPECIFIED ORGANISM: ICD-10-CM

## 2023-06-19 LAB
ALBUMIN SERPL-MCNC: 4 G/DL (ref 3.4–4.8)
ALBUMIN/GLOB SERPL: 1.1 RATIO (ref 1.1–2)
ALP SERPL-CCNC: 59 UNIT/L (ref 40–150)
ALT SERPL-CCNC: 15 UNIT/L (ref 0–55)
AST SERPL-CCNC: 18 UNIT/L (ref 5–34)
BASOPHILS # BLD AUTO: 0.11 X10(3)/MCL
BASOPHILS NFR BLD AUTO: 1.2 %
BILIRUBIN DIRECT+TOT PNL SERPL-MCNC: 0.7 MG/DL
BNP BLD-MCNC: 70.7 PG/ML
BUN SERPL-MCNC: 6.8 MG/DL (ref 8.4–25.7)
CALCIUM SERPL-MCNC: 9.4 MG/DL (ref 8.8–10)
CHLORIDE SERPL-SCNC: 107 MMOL/L (ref 98–107)
CO2 SERPL-SCNC: 26 MMOL/L (ref 23–31)
CREAT SERPL-MCNC: 0.84 MG/DL (ref 0.73–1.18)
EOSINOPHIL # BLD AUTO: 0.28 X10(3)/MCL (ref 0–0.9)
EOSINOPHIL NFR BLD AUTO: 3 %
ERYTHROCYTE [DISTWIDTH] IN BLOOD BY AUTOMATED COUNT: 13 % (ref 11.5–17)
FLUAV AG UPPER RESP QL IA.RAPID: NOT DETECTED
FLUBV AG UPPER RESP QL IA.RAPID: NOT DETECTED
GFR SERPLBLD CREATININE-BSD FMLA CKD-EPI: >60 MLS/MIN/1.73/M2
GLOBULIN SER-MCNC: 3.6 GM/DL (ref 2.4–3.5)
GLUCOSE SERPL-MCNC: 77 MG/DL (ref 82–115)
HCT VFR BLD AUTO: 45.8 % (ref 42–52)
HGB BLD-MCNC: 15.2 G/DL (ref 14–18)
IMM GRANULOCYTES # BLD AUTO: 0.13 X10(3)/MCL (ref 0–0.04)
IMM GRANULOCYTES NFR BLD AUTO: 1.4 %
LYMPHOCYTES # BLD AUTO: 1.45 X10(3)/MCL (ref 0.6–4.6)
LYMPHOCYTES NFR BLD AUTO: 15.8 %
MCH RBC QN AUTO: 33.9 PG (ref 27–31)
MCHC RBC AUTO-ENTMCNC: 33.2 G/DL (ref 33–36)
MCV RBC AUTO: 102.2 FL (ref 80–94)
MONOCYTES # BLD AUTO: 0.43 X10(3)/MCL (ref 0.1–1.3)
MONOCYTES NFR BLD AUTO: 4.7 %
NEUTROPHILS # BLD AUTO: 6.79 X10(3)/MCL (ref 2.1–9.2)
NEUTROPHILS NFR BLD AUTO: 73.9 %
NRBC BLD AUTO-RTO: 0 %
PLATELET # BLD AUTO: 229 X10(3)/MCL (ref 130–400)
PMV BLD AUTO: 9.9 FL (ref 7.4–10.4)
POCT GLUCOSE: 77 MG/DL (ref 70–110)
POTASSIUM SERPL-SCNC: 4.3 MMOL/L (ref 3.5–5.1)
PROT SERPL-MCNC: 7.6 GM/DL (ref 5.8–7.6)
RBC # BLD AUTO: 4.48 X10(6)/MCL (ref 4.7–6.1)
SARS-COV-2 RNA RESP QL NAA+PROBE: NOT DETECTED
SODIUM SERPL-SCNC: 141 MMOL/L (ref 136–145)
TROPONIN I SERPL-MCNC: <0.01 NG/ML (ref 0–0.04)
WBC # SPEC AUTO: 9.19 X10(3)/MCL (ref 4.5–11.5)

## 2023-06-19 PROCEDURE — 94640 AIRWAY INHALATION TREATMENT: CPT

## 2023-06-19 PROCEDURE — 84484 ASSAY OF TROPONIN QUANT: CPT | Performed by: PHYSICIAN ASSISTANT

## 2023-06-19 PROCEDURE — 93005 ELECTROCARDIOGRAM TRACING: CPT

## 2023-06-19 PROCEDURE — 85025 COMPLETE CBC W/AUTO DIFF WBC: CPT | Performed by: PHYSICIAN ASSISTANT

## 2023-06-19 PROCEDURE — 82962 GLUCOSE BLOOD TEST: CPT

## 2023-06-19 PROCEDURE — 25000242 PHARM REV CODE 250 ALT 637 W/ HCPCS: Performed by: PHYSICIAN ASSISTANT

## 2023-06-19 PROCEDURE — 0240U COVID/FLU A&B PCR: CPT | Performed by: PHYSICIAN ASSISTANT

## 2023-06-19 PROCEDURE — 80053 COMPREHEN METABOLIC PANEL: CPT | Performed by: PHYSICIAN ASSISTANT

## 2023-06-19 PROCEDURE — 99285 EMERGENCY DEPT VISIT HI MDM: CPT | Mod: 25

## 2023-06-19 PROCEDURE — 83880 ASSAY OF NATRIURETIC PEPTIDE: CPT | Performed by: PHYSICIAN ASSISTANT

## 2023-06-19 RX ORDER — IPRATROPIUM BROMIDE 0.5 MG/2.5ML
0.5 SOLUTION RESPIRATORY (INHALATION)
Status: DISCONTINUED | OUTPATIENT
Start: 2023-06-19 | End: 2023-06-19

## 2023-06-19 RX ORDER — AZITHROMYCIN 250 MG/1
250 TABLET, FILM COATED ORAL DAILY
Qty: 6 TABLET | Refills: 0 | Status: SHIPPED | OUTPATIENT
Start: 2023-06-19

## 2023-06-19 RX ORDER — PREDNISONE 20 MG/1
40 TABLET ORAL DAILY
Qty: 10 TABLET | Refills: 0 | Status: SHIPPED | OUTPATIENT
Start: 2023-06-19 | End: 2023-06-24

## 2023-06-19 RX ORDER — DEXAMETHASONE SODIUM PHOSPHATE 4 MG/ML
8 INJECTION, SOLUTION INTRA-ARTICULAR; INTRALESIONAL; INTRAMUSCULAR; INTRAVENOUS; SOFT TISSUE
Status: DISCONTINUED | OUTPATIENT
Start: 2023-06-19 | End: 2023-06-19

## 2023-06-19 RX ORDER — BENZONATATE 100 MG/1
100 CAPSULE ORAL 3 TIMES DAILY PRN
Qty: 20 CAPSULE | Refills: 0 | Status: SHIPPED | OUTPATIENT
Start: 2023-06-19 | End: 2023-06-29

## 2023-06-19 RX ORDER — IPRATROPIUM BROMIDE AND ALBUTEROL SULFATE 2.5; .5 MG/3ML; MG/3ML
9 SOLUTION RESPIRATORY (INHALATION)
Status: COMPLETED | OUTPATIENT
Start: 2023-06-19 | End: 2023-06-19

## 2023-06-19 RX ORDER — LEVALBUTEROL 1.25 MG/.5ML
1.25 SOLUTION, CONCENTRATE RESPIRATORY (INHALATION)
Status: DISCONTINUED | OUTPATIENT
Start: 2023-06-19 | End: 2023-06-19

## 2023-06-19 RX ADMIN — IPRATROPIUM BROMIDE AND ALBUTEROL SULFATE 9 ML: .5; 3 SOLUTION RESPIRATORY (INHALATION) at 11:06

## 2023-06-19 NOTE — ED PROVIDER NOTES
Encounter Date: 6/19/2023       History     Chief Complaint   Patient presents with    Cough    Shortness of Breath     Shortness of breath and congested cough x 3 days     Jemal Mcelroy Jr. Is a 67 y.o. male with a history of COPD, HTN who presents to the ED with his wife for evaluation of shortness of breath. Patient has been short of breath with productive cough and congestion for 3 days. They went to see his PCP today who gave him a dose of solumedrol 125 mg and told him to come to the ED for further evaluation. Patient's wife reports he was on home O2 in the past, but he has not been for over a year because he PCP never renewed it. Smokes approx 1PPD. No fevers, chills, chest pain, N/V/D, LE edema.    The history is provided by the patient and the spouse. No  was used.   Review of patient's allergies indicates:   Allergen Reactions    Metformin Diarrhea and Other (See Comments)     Other reaction(s): Other     Past Medical History:   Diagnosis Date    Cancer     COPD (chronic obstructive pulmonary disease)     Hypertension     Personal history of colonic polyps      Past Surgical History:   Procedure Laterality Date    COLONOSCOPY  03/17/2017     History reviewed. No pertinent family history.  Social History     Tobacco Use    Smoking status: Every Day     Packs/day: 1.00     Types: Cigarettes    Smokeless tobacco: Never   Substance Use Topics    Drug use: Yes     Frequency: 2.0 times per week     Types: Marijuana     Review of Systems   Constitutional:  Negative for activity change, chills and fever.   HENT:  Positive for congestion. Negative for trouble swallowing.    Eyes:  Negative for photophobia and visual disturbance.   Respiratory:  Positive for cough, shortness of breath and wheezing. Negative for chest tightness.    Cardiovascular:  Negative for chest pain, palpitations and leg swelling.   Gastrointestinal:  Negative for abdominal pain, constipation, diarrhea, nausea and vomiting.    Genitourinary:  Negative for dysuria, frequency, hematuria and urgency.   Musculoskeletal:  Negative for arthralgias, back pain and gait problem.   Skin:  Negative for color change and rash.   Neurological:  Negative for dizziness, syncope, weakness, light-headedness, numbness and headaches.   Psychiatric/Behavioral:  Negative for agitation and confusion. The patient is not nervous/anxious.      Physical Exam     Initial Vitals [06/19/23 1050]   BP Pulse Resp Temp SpO2   (!) 161/93 96 (!) 28 98.4 °F (36.9 °C) (!) 94 %      MAP       --         Physical Exam    Nursing note and vitals reviewed.  Constitutional: He appears well-developed and well-nourished. No distress.   HENT:   Head: Normocephalic and atraumatic.   Mouth/Throat: No oropharyngeal exudate.   Eyes: EOM are normal. No scleral icterus.   Neck: Neck supple.   Normal range of motion.  Cardiovascular:  Normal rate and regular rhythm.           No murmur heard.  Pulmonary/Chest: Tachypnea noted. He has wheezes (diffuse expiratory wheezes).   On 2L NC   Abdominal: Abdomen is soft. He exhibits no distension. There is no abdominal tenderness.   Musculoskeletal:         General: No edema. Normal range of motion.      Cervical back: Normal range of motion and neck supple.     Neurological: He is alert and oriented to person, place, and time. No cranial nerve deficit.   Skin: Skin is warm and dry. Capillary refill takes less than 2 seconds. No erythema.   Psychiatric: He has a normal mood and affect. Thought content normal.       ED Course   Procedures  Labs Reviewed   COMPREHENSIVE METABOLIC PANEL - Abnormal; Notable for the following components:       Result Value    Glucose Level 77 (*)     Blood Urea Nitrogen 6.8 (*)     Globulin 3.6 (*)     All other components within normal limits   CBC WITH DIFFERENTIAL - Abnormal; Notable for the following components:    RBC 4.48 (*)     .2 (*)     MCH 33.9 (*)     IG# 0.13 (*)     All other components within  normal limits   COVID/FLU A&B PCR - Normal    Narrative:     The Xpert Xpress SARS-CoV-2/FLU/RSV plus is a rapid, multiplexed real-time PCR test intended for the simultaneous qualitative detection and differentiation of SARS-CoV-2, Influenza A, Influenza B, and respiratory syncytial virus (RSV) viral RNA in either nasopharyngeal swab or nasal swab specimens.         B-TYPE NATRIURETIC PEPTIDE - Normal   TROPONIN I - Normal   CBC W/ AUTO DIFFERENTIAL    Narrative:     The following orders were created for panel order CBC Auto Differential.  Procedure                               Abnormality         Status                     ---------                               -----------         ------                     CBC with Differential[[id number]]        Abnormal            Final result                 Please view results for these tests on the individual orders.   EXTRA TUBES    Narrative:     The following orders were created for panel order EXTRA TUBES.  Procedure                               Abnormality         Status                     ---------                               -----------         ------                     Light Blue Top Hold[[id number]]                              In process                 Red Top Hold[[id number]]                                     In process                 Pink Top Hold[[id number]]                                    In process                   Please view results for these tests on the individual orders.   LIGHT BLUE TOP HOLD   RED TOP HOLD   PINK TOP HOLD   POCT GLUCOSE        ECG Results              EKG 12-lead (Final result)  Result time 06/19/23 21:56:51      Final result by Interface, Lab In [facility] (06/19/23 21:56:51)                   Narrative:    Test Reason : R06.02,    Vent. Rate : 110 BPM     Atrial Rate : 063 BPM     P-R Int : 000 ms          QRS Dur : 082 ms      QT Int : 364 ms       P-R-T Axes : 000 -64 104 degrees     QTc Int : 492 ms    Technically poor ECG  tracing  Undetermined rhythm  Left anterior fascicular block  Abnormal ECG  Confirmed by Alber Hawkins MD ([id number]) on 6/19/2023 9:56:44 PM    Referred By: JADYN   SELF           Confirmed By:Alber Hawkins MD                                  Imaging Results              X-Ray Chest PA And Lateral (Final result)  Result time 06/19/23 12:53:34      Final result by Jaciel Granados MD (06/19/23 12:53:34)                   Impression:      No acute cardiopulmonary process.      Electronically signed by: Jaciel Granados  Date:    06/19/2023  Time:    12:53               Narrative:    EXAMINATION:  XR CHEST PA AND LATERAL    CLINICAL HISTORY:  Cough, unspecified    TECHNIQUE:  Two views of the chest    COMPARISON:  05/14/2023    FINDINGS:  No focal opacification, pleural effusion, or pneumothorax.    The cardiomediastinal silhouette is within normal limits.    No acute osseous abnormality.                                       Medications   albuterol-ipratropium 2.5 mg-0.5 mg/3 mL nebulizer solution 9 mL (9 mLs Nebulization Given 6/19/23 1155)     Medical Decision Making:   Initial Assessment:   Tachypneic, uncomfortable appearing. Satting O2 at 94% on RA so placed on 2L NC with improvement to 97%  Differential Diagnosis:   COPD exacerbation, pneumonia, flu, covid, NSTEMI, CHF  Clinical Tests:   Lab Tests: Ordered and Reviewed  Radiological Study: Ordered and Reviewed  ED Management:  COVID, flu negative. CXR without evidence of pneumonia or pulmonary edema. Troponin negative. BNP WNL. Pt denies history of CHF, does not appear overloaded on exam. Denies chest pain. Wheezing and symptoms improved with duonebs, pt received IM solumedrol PTA. Consistent with acute bronchitis/COPD exacerbation. Pt stable for discharge with prednisone x 5 days and azithromycin. Strict ED return precautions given to he and his wife. Encouraged smoking cessation. They verbalized understanding. All questions answers.      APC / Resident Notes:   I was  not physically present during the history, exam or disposition of this patient. I was available at all times for consultation. (Daniel)      ED Course as of 06/20/23 0403   Mon Jun 19, 2023   1151 Per patient's wife he received 125 solumedrol at PCP office PTA [KD]   1306 Pt re-evaluated at this time. Appears much more comfortable. Wheezing significantly improved. Maintaining O2 sats at 94% on room air. [KD]      ED Course User Index  [KD] Marley Daniel PA-C                   Clinical Impression:   Final diagnoses:  [R05.9] Cough  [R06.02] SOB (shortness of breath)  [J44.1] COPD with acute exacerbation (Primary)  [J20.9] Acute bronchitis, unspecified organism        ED Disposition Condition    Discharge Stable          ED Prescriptions       Medication Sig Dispense Start Date End Date Auth. Provider    predniSONE (DELTASONE) 20 MG tablet Take 2 tablets (40 mg total) by mouth once daily. for 5 days 10 tablet 6/19/2023 6/24/2023 Marley Daniel PA-C    azithromycin (Z-STEPHANIE) 250 MG tablet Take 1 tablet (250 mg total) by mouth once daily. Take first 2 tablets together, then 1 every day until finished. 6 tablet 6/19/2023 -- Marley Daniel PA-C    benzonatate (TESSALON) 100 MG capsule Take 1 capsule (100 mg total) by mouth 3 (three) times daily as needed for Cough. 20 capsule 6/19/2023 6/29/2023 Marley Daniel PA-C          Follow-up Information       Follow up With Specialties Details Why Contact Info    [facility] Emergency Medicine  If symptoms worsen [address] [facility] [address] [phone]    PCP  In 3 days Hospital follow up              Marley Daniel PA-C  06/19/23 2750       Crow Sanchze MD  06/20/23 7251

## 2023-09-22 DIAGNOSIS — Z72.0 TOBACCO ABUSE: Primary | ICD-10-CM

## 2023-09-27 ENCOUNTER — HOSPITAL ENCOUNTER (OUTPATIENT)
Dept: RADIOLOGY | Facility: HOSPITAL | Age: 68
Discharge: HOME | End: 2023-09-27
Attending: FAMILY MEDICINE
Payer: OTHER GOVERNMENT

## 2023-09-27 DIAGNOSIS — Z72.0 TOBACCO ABUSE: ICD-10-CM

## 2023-09-27 PROCEDURE — 76706 US ABDL AORTA SCREEN AAA: CPT | Mod: TC

## 2023-10-23 ENCOUNTER — HOSPITAL ENCOUNTER (EMERGENCY)
Facility: HOSPITAL | Age: 68
Discharge: HOME | End: 2023-10-23
Attending: INTERNAL MEDICINE
Payer: OTHER GOVERNMENT

## 2023-10-23 VITALS
OXYGEN SATURATION: 94 % | HEIGHT: 69 IN | TEMPERATURE: 98 F | BODY MASS INDEX: 30.44 KG/M2 | SYSTOLIC BLOOD PRESSURE: 121 MMHG | WEIGHT: 205.5 LBS | RESPIRATION RATE: 22 BRPM | DIASTOLIC BLOOD PRESSURE: 83 MMHG | HEART RATE: 77 BPM

## 2023-10-23 DIAGNOSIS — R07.9 CHEST PAIN: ICD-10-CM

## 2023-10-23 DIAGNOSIS — R06.02 SOB (SHORTNESS OF BREATH): ICD-10-CM

## 2023-10-23 DIAGNOSIS — J44.1 COPD EXACERBATION: Primary | ICD-10-CM

## 2023-10-23 LAB
ALBUMIN SERPL-MCNC: 4 G/DL (ref 3.4–4.8)
ALBUMIN/GLOB SERPL: 1.1 RATIO (ref 1.1–2)
ALP SERPL-CCNC: 57 UNIT/L (ref 40–150)
ALT SERPL-CCNC: 14 UNIT/L (ref 0–55)
AST SERPL-CCNC: 16 UNIT/L (ref 5–34)
BASOPHILS # BLD AUTO: 0.12 X10(3)/MCL
BASOPHILS NFR BLD AUTO: 1.5 %
BILIRUB SERPL-MCNC: 0.6 MG/DL
BNP BLD-MCNC: 12.8 PG/ML
BUN SERPL-MCNC: 8.1 MG/DL (ref 8.4–25.7)
CALCIUM SERPL-MCNC: 9.8 MG/DL (ref 8.8–10)
CHLORIDE SERPL-SCNC: 103 MMOL/L (ref 98–107)
CO2 SERPL-SCNC: 29 MMOL/L (ref 23–31)
CREAT SERPL-MCNC: 0.97 MG/DL (ref 0.73–1.18)
EOSINOPHIL # BLD AUTO: 0.44 X10(3)/MCL (ref 0–0.9)
EOSINOPHIL NFR BLD AUTO: 5.6 %
ERYTHROCYTE [DISTWIDTH] IN BLOOD BY AUTOMATED COUNT: 13.6 % (ref 11.5–17)
FLUAV AG UPPER RESP QL IA.RAPID: NOT DETECTED
FLUBV AG UPPER RESP QL IA.RAPID: NOT DETECTED
GFR SERPLBLD CREATININE-BSD FMLA CKD-EPI: >60 MLS/MIN/1.73/M2
GLOBULIN SER-MCNC: 3.6 GM/DL (ref 2.4–3.5)
GLUCOSE SERPL-MCNC: 72 MG/DL (ref 82–115)
HCT VFR BLD AUTO: 47 % (ref 42–52)
HGB BLD-MCNC: 15.8 G/DL (ref 14–18)
IMM GRANULOCYTES # BLD AUTO: 0.03 X10(3)/MCL (ref 0–0.04)
IMM GRANULOCYTES NFR BLD AUTO: 0.4 %
LYMPHOCYTES # BLD AUTO: 2.31 X10(3)/MCL (ref 0.6–4.6)
LYMPHOCYTES NFR BLD AUTO: 29.2 %
MCH RBC QN AUTO: 35.2 PG (ref 27–31)
MCHC RBC AUTO-ENTMCNC: 33.6 G/DL (ref 33–36)
MCV RBC AUTO: 104.7 FL (ref 80–94)
MONOCYTES # BLD AUTO: 0.71 X10(3)/MCL (ref 0.1–1.3)
MONOCYTES NFR BLD AUTO: 9 %
NEUTROPHILS # BLD AUTO: 4.3 X10(3)/MCL (ref 2.1–9.2)
NEUTROPHILS NFR BLD AUTO: 54.3 %
NRBC BLD AUTO-RTO: 0 %
PLATELET # BLD AUTO: 213 X10(3)/MCL (ref 130–400)
PMV BLD AUTO: 9.6 FL (ref 7.4–10.4)
POTASSIUM SERPL-SCNC: 4.2 MMOL/L (ref 3.5–5.1)
PROT SERPL-MCNC: 7.6 GM/DL (ref 5.8–7.6)
RBC # BLD AUTO: 4.49 X10(6)/MCL (ref 4.7–6.1)
SARS-COV-2 RNA RESP QL NAA+PROBE: NOT DETECTED
SODIUM SERPL-SCNC: 140 MMOL/L (ref 136–145)
TROPONIN I SERPL-MCNC: <0.01 NG/ML (ref 0–0.04)
WBC # SPEC AUTO: 7.91 X10(3)/MCL (ref 4.5–11.5)

## 2023-10-23 PROCEDURE — 25000242 PHARM REV CODE 250 ALT 637 W/ HCPCS: Performed by: INTERNAL MEDICINE

## 2023-10-23 PROCEDURE — 83880 ASSAY OF NATRIURETIC PEPTIDE: CPT | Performed by: INTERNAL MEDICINE

## 2023-10-23 PROCEDURE — 96374 THER/PROPH/DIAG INJ IV PUSH: CPT

## 2023-10-23 PROCEDURE — 80053 COMPREHEN METABOLIC PANEL: CPT | Performed by: INTERNAL MEDICINE

## 2023-10-23 PROCEDURE — 63600175 PHARM REV CODE 636 W HCPCS: Performed by: INTERNAL MEDICINE

## 2023-10-23 PROCEDURE — 94640 AIRWAY INHALATION TREATMENT: CPT | Mod: XB

## 2023-10-23 PROCEDURE — 85025 COMPLETE CBC W/AUTO DIFF WBC: CPT | Performed by: INTERNAL MEDICINE

## 2023-10-23 PROCEDURE — 93005 ELECTROCARDIOGRAM TRACING: CPT

## 2023-10-23 PROCEDURE — 99285 EMERGENCY DEPT VISIT HI MDM: CPT | Mod: 25

## 2023-10-23 PROCEDURE — 0240U COVID/FLU A&B PCR: CPT | Performed by: INTERNAL MEDICINE

## 2023-10-23 PROCEDURE — 84484 ASSAY OF TROPONIN QUANT: CPT | Performed by: INTERNAL MEDICINE

## 2023-10-23 RX ORDER — MONTELUKAST SODIUM 10 MG/1
10 TABLET ORAL NIGHTLY
Qty: 10 TABLET | Refills: 0 | Status: SHIPPED | OUTPATIENT
Start: 2023-10-23 | End: 2023-11-02

## 2023-10-23 RX ORDER — DEXAMETHASONE SODIUM PHOSPHATE 4 MG/ML
4 INJECTION, SOLUTION INTRA-ARTICULAR; INTRALESIONAL; INTRAMUSCULAR; INTRAVENOUS; SOFT TISSUE
Status: COMPLETED | OUTPATIENT
Start: 2023-10-23 | End: 2023-10-23

## 2023-10-23 RX ORDER — IPRATROPIUM BROMIDE AND ALBUTEROL SULFATE 2.5; .5 MG/3ML; MG/3ML
3 SOLUTION RESPIRATORY (INHALATION)
Status: COMPLETED | OUTPATIENT
Start: 2023-10-23 | End: 2023-10-23

## 2023-10-23 RX ORDER — PREDNISONE 20 MG/1
40 TABLET ORAL DAILY
Qty: 10 TABLET | Refills: 0 | Status: SHIPPED | OUTPATIENT
Start: 2023-10-23 | End: 2023-10-28

## 2023-10-23 RX ADMIN — IPRATROPIUM BROMIDE AND ALBUTEROL SULFATE 3 ML: 2.5; .5 SOLUTION RESPIRATORY (INHALATION) at 02:10

## 2023-10-23 RX ADMIN — IPRATROPIUM BROMIDE AND ALBUTEROL SULFATE 3 ML: 2.5; .5 SOLUTION RESPIRATORY (INHALATION) at 04:10

## 2023-10-23 RX ADMIN — DEXAMETHASONE SODIUM PHOSPHATE 4 MG: 4 INJECTION, SOLUTION INTRA-ARTICULAR; INTRALESIONAL; INTRAMUSCULAR; INTRAVENOUS; SOFT TISSUE at 04:10

## 2023-10-23 NOTE — ED PROVIDER NOTES
Encounter Date: 10/23/2023       History     Chief Complaint   Patient presents with    Shortness of Breath    Chest Pain     X5 days, hx of COPD and lung CA, started after cutting grass     Presents with shortness of breath after cutting grass. Hx of lung CA in remission. Denies fever or sick contacts.     The history is provided by the patient and a relative.     Review of patient's allergies indicates:   Allergen Reactions    Metformin Diarrhea and Other (See Comments)     Other reaction(s): Other     Past Medical History:   Diagnosis Date    Cancer     COPD (chronic obstructive pulmonary disease)     Hypertension     Personal history of colonic polyps      Past Surgical History:   Procedure Laterality Date    COLONOSCOPY  03/17/2017     History reviewed. No pertinent family history.  Social History     Tobacco Use    Smoking status: Every Day     Current packs/day: 1.00     Types: Cigarettes    Smokeless tobacco: Never   Substance Use Topics    Drug use: Yes     Frequency: 2.0 times per week     Types: Marijuana     Review of Systems   Constitutional:  Negative for fever.   HENT:  Negative for sore throat.    Respiratory:  Positive for cough and shortness of breath.    Cardiovascular:  Negative for chest pain.   Gastrointestinal:  Negative for nausea.   Genitourinary:  Negative for dysuria.   Musculoskeletal:  Negative for back pain.   Skin:  Negative for rash.   Neurological:  Negative for weakness.   Hematological:  Does not bruise/bleed easily.   All other systems reviewed and are negative.      Physical Exam     Initial Vitals [10/23/23 1315]   BP Pulse Resp Temp SpO2   130/86 83 20 98.3 °F (36.8 °C) 96 %      MAP       --         Physical Exam    Nursing note and vitals reviewed.  Constitutional: He appears well-developed and well-nourished. No distress.   HENT:   Head: Normocephalic and atraumatic.   Mouth/Throat: Oropharynx is clear and moist.   Eyes: Conjunctivae are normal. Pupils are equal, round, and  reactive to light.   Neck: Neck supple. No tracheal deviation present. No JVD present.   Normal range of motion.  Cardiovascular:  Regular rhythm, normal heart sounds and intact distal pulses.           Pulmonary/Chest: No respiratory distress. He has wheezes.   Decrease sounds on right side   Abdominal: Abdomen is soft. Bowel sounds are normal. He exhibits no distension. There is no abdominal tenderness. There is no rebound and no guarding.   Musculoskeletal:         General: No edema. Normal range of motion.      Cervical back: Normal range of motion and neck supple.     Neurological: He is alert and oriented to person, place, and time. He has normal strength. GCS score is 15. GCS eye subscore is 4. GCS verbal subscore is 5. GCS motor subscore is 6.   Skin: Skin is warm and dry. No rash noted.   Psychiatric: His behavior is normal.         ED Course   Procedures  Labs Reviewed   COMPREHENSIVE METABOLIC PANEL - Abnormal; Notable for the following components:       Result Value    Glucose Level 72 (*)     Blood Urea Nitrogen 8.1 (*)     Globulin 3.6 (*)     All other components within normal limits   CBC WITH DIFFERENTIAL - Abnormal; Notable for the following components:    RBC 4.49 (*)     .7 (*)     MCH 35.2 (*)     All other components within normal limits   TROPONIN I - Normal   B-TYPE NATRIURETIC PEPTIDE - Normal   COVID/FLU A&B PCR - Normal    Narrative:     The Xpert Xpress SARS-CoV-2/FLU/RSV plus is a rapid, multiplexed real-time PCR test intended for the simultaneous qualitative detection and differentiation of SARS-CoV-2, Influenza A, Influenza B, and respiratory syncytial virus (RSV) viral RNA in either nasopharyngeal swab or nasal swab specimens.         CBC W/ AUTO DIFFERENTIAL    Narrative:     The following orders were created for panel order CBC auto differential.  Procedure                               Abnormality         Status                     ---------                                -----------         ------                     CBC with Differential[[id number]]       Abnormal            Final result                 Please view results for these tests on the individual orders.   EXTRA TUBES    Narrative:     The following orders were created for panel order EXTRA TUBES.  Procedure                               Abnormality         Status                     ---------                               -----------         ------                     Light Blue Top Hold[[id number]]                             In process                 Gold Top Hold[[id number]]                                   In process                 Pink Top Hold[[id number]]                                   In process                   Please view results for these tests on the individual orders.   LIGHT BLUE TOP HOLD   GOLD TOP HOLD   PINK TOP HOLD     EKG Readings: (Independently Interpreted)   Initial Reading: No STEMI. Rhythm: Normal Sinus Rhythm. Heart Rate: 78. Ectopy: No Ectopy. Conduction: Normal and 1st Degree AV Block. ST Segments: Normal ST Segments. T Waves: Normal. Axis: Left Axis Deviation. Clinical Impression: Normal Sinus Rhythm     ECG Results              EKG 12-lead (Shortness of Breath) Age > 50 (Final result)  Result time 10/23/23 13:58:50      Final result by Interface, Lab In [facility] (10/23/23 13:58:50)                   Narrative:    Test Reason : R06.02,    Vent. Rate : 078 BPM     Atrial Rate : 078 BPM     P-R Int : 216 ms          QRS Dur : 084 ms      QT Int : 378 ms       P-R-T Axes : 077 -26 060 degrees     QTc Int : 430 ms    Sinus rhythm with 1st degree A-V block  Otherwise normal ECG    Confirmed by Gunner Reddy MD ([id number]) on 10/23/2023 1:58:37 PM    Referred By: [email or web address]   SELF           Confirmed By:Gunner Reddy MD                                  Imaging Results              X-Ray Chest AP Portable (Final result)  Result time 10/23/23 14:07:01      Final result by Lyle Ziegler MD  (10/23/23 14:07:01)                   Impression:      NO ACUTE CARDIOPULMONARY PROCESS IDENTIFIED.      Electronically signed by: Lyle Ziegler  Date:    10/23/2023  Time:    14:07               Narrative:    EXAMINATION:  XR CHEST AP PORTABLE    CLINICAL HISTORY:  Chest Pain;    TECHNIQUE:  One view    COMPARISON:  June 19, 2023.    FINDINGS:  Cardiopericardial silhouette is within normal limits.  Right hilar and perihilar metallic clips.  There are chronic interstitial changes of lungs without dense focal or segmental consolidation, congestive process, pleural effusions or pneumothorax.                                       Medications   albuterol-ipratropium 2.5 mg-0.5 mg/3 mL nebulizer solution 3 mL (3 mLs Nebulization Given 10/23/23 1425)   albuterol-ipratropium 2.5 mg-0.5 mg/3 mL nebulizer solution 3 mL (3 mLs Nebulization Given 10/23/23 1610)   dexAMETHasone injection 4 mg (4 mg Intravenous Given 10/23/23 1609)     Medical Decision Making  Amount and/or Complexity of Data Reviewed  Independent Historian: spouse     Details: States have been coughing more for the last few days  Labs: ordered. Decision-making details documented in ED Course.  Radiology: ordered and independent interpretation performed. Decision-making details documented in ED Course.  ECG/medicine tests: ordered and independent interpretation performed. Decision-making details documented in ED Course.    Risk  Prescription drug management.      Additional MDM:   Differential Diagnosis:   Pneumonia, Asthma exacerbation, COPD exacerbation, Pericardial Effusion, Hear Failure, Pulmonary Emboli, Pleural effusion, malignancy, among others                               Clinical Impression:   Final diagnoses:  [R06.02] SOB (shortness of breath)  [R07.9] Chest pain  [J44.1] COPD exacerbation (Primary)        ED Disposition Condition    Discharge Stable          ED Prescriptions       Medication Sig Dispense Start Date End Date Auth. Provider     predniSONE (DELTASONE) 20 MG tablet Take 2 tablets (40 mg total) by mouth once daily. for 5 days 10 tablet 10/23/2023 10/28/2023 Denzel De La Rosa MD    montelukast (SINGULAIR) 10 mg tablet Take 1 tablet (10 mg total) by mouth every evening. for 10 days 10 tablet 10/23/2023 11/2/2023 Denzel De La Rosa MD          Follow-up Information       Follow up With Specialties Details Why Contact Info    [facility] Emergency Medicine  If symptoms worsen [facility] [facility] [address] [phone]    [facility]  Schedule an appointment as soon as possible for a visit in 1 month  [address] [place] [id number]             Denzel De La Rosa MD  10/23/23 5898

## 2023-10-24 ENCOUNTER — NURSE TRIAGE (OUTPATIENT)
Dept: ADMINISTRATIVE | Facility: CLINIC | Age: 68
End: 2023-10-24
Payer: OTHER GOVERNMENT

## 2023-10-25 NOTE — TELEPHONE ENCOUNTER
Pt's wife reports pt was started on Singulair, took his first dose tonight around 7pm and then started having the hiccups, wanting to know if that is a side effect of the medication or not, Pt advised to call PCP within 24 hours per protocol and some things they could try at home to stop the hiccups, Wife/Pt encouraged to call back with any worsening symptoms or questions. She verbalized understanding.    Reason for Disposition   Onset after starting new prescription medication    Additional Information   Negative: Patient sounds very sick or weak to the triager   Negative: [1] Hiccups present > 3 hours AND [2] severe AND [3] no improvement using hiccup treatment per protocol   Negative: [1] Hiccups present > 24 hours AND [2] nearly continuous   Negative: Hiccups interfere with work, school, or sleep   Negative: Recent abdominal, chest or brain surgery    Protocols used: [id number]

## 2024-02-09 ENCOUNTER — HOSPITAL ENCOUNTER (EMERGENCY)
Facility: HOSPITAL | Age: 69
Discharge: HOME | End: 2024-02-09
Attending: INTERNAL MEDICINE
Payer: OTHER GOVERNMENT

## 2024-02-09 VITALS
SYSTOLIC BLOOD PRESSURE: 169 MMHG | TEMPERATURE: 99 F | OXYGEN SATURATION: 91 % | DIASTOLIC BLOOD PRESSURE: 97 MMHG | HEART RATE: 83 BPM | RESPIRATION RATE: 19 BRPM

## 2024-02-09 DIAGNOSIS — R55 SYNCOPE: ICD-10-CM

## 2024-02-09 DIAGNOSIS — Z87.09 HISTORY OF COPD: Primary | ICD-10-CM

## 2024-02-09 LAB
ALBUMIN SERPL-MCNC: 3.6 G/DL (ref 3.4–4.8)
ALBUMIN/GLOB SERPL: 1.2 RATIO (ref 1.1–2)
ALP SERPL-CCNC: 49 UNIT/L (ref 40–150)
ALT SERPL-CCNC: 17 UNIT/L (ref 0–55)
AST SERPL-CCNC: 17 UNIT/L (ref 5–34)
BASOPHILS # BLD AUTO: 0.1 X10(3)/MCL
BASOPHILS NFR BLD AUTO: 1.4 %
BILIRUB SERPL-MCNC: 0.6 MG/DL
BUN SERPL-MCNC: 10.3 MG/DL (ref 8.4–25.7)
CALCIUM SERPL-MCNC: 8.7 MG/DL (ref 8.8–10)
CHLORIDE SERPL-SCNC: 108 MMOL/L (ref 98–107)
CO2 SERPL-SCNC: 29 MMOL/L (ref 23–31)
CREAT SERPL-MCNC: 1.06 MG/DL (ref 0.73–1.18)
EOSINOPHIL # BLD AUTO: 0.36 X10(3)/MCL (ref 0–0.9)
EOSINOPHIL NFR BLD AUTO: 5.1 %
ERYTHROCYTE [DISTWIDTH] IN BLOOD BY AUTOMATED COUNT: 13.4 % (ref 11.5–17)
GFR SERPLBLD CREATININE-BSD FMLA CKD-EPI: >60 MLS/MIN/1.73/M2
GLOBULIN SER-MCNC: 3.1 GM/DL (ref 2.4–3.5)
GLUCOSE SERPL-MCNC: 119 MG/DL (ref 82–115)
HCT VFR BLD AUTO: 43.4 % (ref 42–52)
HGB BLD-MCNC: 14.8 G/DL (ref 14–18)
HOLD SPECIMEN: NORMAL
HOLD SPECIMEN: NORMAL
IMM GRANULOCYTES # BLD AUTO: 0.02 X10(3)/MCL (ref 0–0.04)
IMM GRANULOCYTES NFR BLD AUTO: 0.3 %
LYMPHOCYTES # BLD AUTO: 1.68 X10(3)/MCL (ref 0.6–4.6)
LYMPHOCYTES NFR BLD AUTO: 23.6 %
MCH RBC QN AUTO: 35.5 PG (ref 27–31)
MCHC RBC AUTO-ENTMCNC: 34.1 G/DL (ref 33–36)
MCV RBC AUTO: 104.1 FL (ref 80–94)
MONOCYTES # BLD AUTO: 0.47 X10(3)/MCL (ref 0.1–1.3)
MONOCYTES NFR BLD AUTO: 6.6 %
NEUTROPHILS # BLD AUTO: 4.48 X10(3)/MCL (ref 2.1–9.2)
NEUTROPHILS NFR BLD AUTO: 63 %
NRBC BLD AUTO-RTO: 0 %
PLATELET # BLD AUTO: 210 X10(3)/MCL (ref 130–400)
PMV BLD AUTO: 9.7 FL (ref 7.4–10.4)
POTASSIUM SERPL-SCNC: 4.2 MMOL/L (ref 3.5–5.1)
PROT SERPL-MCNC: 6.7 GM/DL (ref 5.8–7.6)
RBC # BLD AUTO: 4.17 X10(6)/MCL (ref 4.7–6.1)
SODIUM SERPL-SCNC: 140 MMOL/L (ref 136–145)
WBC # SPEC AUTO: 7.11 X10(3)/MCL (ref 4.5–11.5)

## 2024-02-09 PROCEDURE — 80053 COMPREHEN METABOLIC PANEL: CPT | Performed by: INTERNAL MEDICINE

## 2024-02-09 PROCEDURE — 25000003 PHARM REV CODE 250: Performed by: INTERNAL MEDICINE

## 2024-02-09 PROCEDURE — 94640 AIRWAY INHALATION TREATMENT: CPT

## 2024-02-09 PROCEDURE — 93005 ELECTROCARDIOGRAM TRACING: CPT

## 2024-02-09 PROCEDURE — 85025 COMPLETE CBC W/AUTO DIFF WBC: CPT | Performed by: INTERNAL MEDICINE

## 2024-02-09 PROCEDURE — 82962 GLUCOSE BLOOD TEST: CPT

## 2024-02-09 PROCEDURE — 25000242 PHARM REV CODE 250 ALT 637 W/ HCPCS: Performed by: INTERNAL MEDICINE

## 2024-02-09 PROCEDURE — 99285 EMERGENCY DEPT VISIT HI MDM: CPT | Mod: 25

## 2024-02-09 RX ORDER — BUTALBITAL, ACETAMINOPHEN AND CAFFEINE 50; 325; 40 MG/1; MG/1; MG/1
2 TABLET ORAL
Status: COMPLETED | OUTPATIENT
Start: 2024-02-09 | End: 2024-02-09

## 2024-02-09 RX ORDER — IPRATROPIUM BROMIDE AND ALBUTEROL SULFATE 2.5; .5 MG/3ML; MG/3ML
3 SOLUTION RESPIRATORY (INHALATION)
Status: COMPLETED | OUTPATIENT
Start: 2024-02-09 | End: 2024-02-09

## 2024-02-09 RX ADMIN — BUTALBITAL, ACETAMINOPHEN, AND CAFFEINE 2 TABLET: 325; 50; 40 TABLET ORAL at 12:02

## 2024-02-09 RX ADMIN — IPRATROPIUM BROMIDE AND ALBUTEROL SULFATE 3 ML: .5; 3 SOLUTION RESPIRATORY (INHALATION) at 01:02

## 2024-02-09 NOTE — ED PROVIDER NOTES
Encounter Date: 2/9/2024       History     Chief Complaint   Patient presents with    Loss of Consciousness     PT  IN /[facility] W REPORT OF SYNCOPE PTA.  DENIES CP/SOB.  FEELS HE GOT UP TO FAST FROM SITTING AND GOT DIZZY AND FELL.  CO HA > 3 DAYS. NO NEURO DEFICITS NOTED.  CBG EN ROUTE 156.  18 G IV TO LT AC PT RECEIVED 450CC NS /[facility]. EKG OBTAINED.      Presents by EMS after a syncope episode at home today. Hx of mesothelioma. States he was walking and started coughing after which pass out. Wife stsconnie heard he falling and saw him on the floor, was shaking with LOC for less than 1 minute.     The history is provided by the patient, the spouse and the EMS personnel.     Review of patient's allergies indicates:   Allergen Reactions    Metformin Diarrhea and Other (See Comments)     Other reaction(s): Other     Past Medical History:   Diagnosis Date    Cancer     COPD (chronic obstructive pulmonary disease)     Hypertension     Personal history of colonic polyps      Past Surgical History:   Procedure Laterality Date    COLONOSCOPY  03/17/2017     No family history on file.  Social History     Tobacco Use    Smoking status: Every Day     Current packs/day: 1.00     Types: Cigarettes    Smokeless tobacco: Never   Substance Use Topics    Drug use: Yes     Frequency: 2.0 times per week     Types: Marijuana     Review of Systems   Respiratory:  Positive for cough.    Neurological:  Positive for syncope.   All other systems reviewed and are negative.      Physical Exam     Initial Vitals [02/09/24 1233]   BP Pulse Resp Temp SpO2   (!) 169/97 88 18 98.6 °F (37 °C) (!) 94 %      MAP       --         Physical Exam    Nursing note and vitals reviewed.  Constitutional: He appears well-developed and well-nourished. No distress.   HENT:   Head: Normocephalic and atraumatic.   Mouth/Throat: Oropharynx is clear and moist.   No tongue injury   Eyes: Conjunctivae and EOM are normal. Pupils are equal, round, and reactive to light.    Neck: Neck supple. No thyromegaly present. No JVD present.   Normal range of motion.  Cardiovascular:  Regular rhythm, normal heart sounds and intact distal pulses.           Pulmonary/Chest: Breath sounds normal. No respiratory distress.   Prolonged respiratory phase   Abdominal: Abdomen is soft. Bowel sounds are normal. He exhibits no distension. There is no abdominal tenderness. There is no rebound and no guarding.   Musculoskeletal:         General: No edema. Normal range of motion.      Cervical back: Normal range of motion and neck supple.     Neurological: He is alert and oriented to person, place, and time. He has normal strength. No cranial nerve deficit. GCS score is 15. GCS eye subscore is 4. GCS verbal subscore is 5. GCS motor subscore is 6.   Skin: Skin is warm and dry. No rash noted.   Psychiatric: His behavior is normal.         ED Course   Procedures  Labs Reviewed   COMPREHENSIVE METABOLIC PANEL - Abnormal; Notable for the following components:       Result Value    Chloride 108 (*)     Glucose Level 119 (*)     Calcium Level Total 8.7 (*)     All other components within normal limits   CBC WITH DIFFERENTIAL - Abnormal; Notable for the following components:    RBC 4.17 (*)     .1 (*)     MCH 35.5 (*)     All other components within normal limits   CBC W/ AUTO DIFFERENTIAL    Narrative:     The following orders were created for panel order CBC auto differential.  Procedure                               Abnormality         Status                     ---------                               -----------         ------                     CBC with Differential[[id number]]       Abnormal            Final result                 Please view results for these tests on the individual orders.   EXTRA TUBES    Narrative:     The following orders were created for panel order EXTRA TUBES.  Procedure                               Abnormality         Status                     ---------                                -----------         ------                     Light Blue Top Hold[[id number]]                             In process                 Gold Top Hold[[id number]]                                   In process                   Please view results for these tests on the individual orders.   LIGHT BLUE TOP HOLD   GOLD TOP HOLD   POCT GLUCOSE, HAND-HELD DEVICE     EKG Readings: (Independently Interpreted)   Initial Reading: No STEMI. Rhythm: Normal Sinus Rhythm. Heart Rate: 88. Ectopy: No Ectopy. Conduction: Normal. ST Segments: Normal ST Segments. T Waves: Normal. Clinical Impression: Normal Sinus Rhythm     ECG Results              EKG 12-lead (In process)        Collection Time Result Time QRS Duration OHS QTC Calculation    02/09/24 12:38:32 02/09/24 12:39:38 88 438                     In process by Interface, Lab In [facility] (02/09/24 12:39:44)                   Narrative:    Test Reason : R55,    Vent. Rate : 087 BPM     Atrial Rate : 087 BPM     P-R Int : 204 ms          QRS Dur : 088 ms      QT Int : 364 ms       P-R-T Axes : 087 -21 066 degrees     QTc Int : 438 ms    Normal sinus rhythm  Normal ECG  When compared with ECG of 23-OCT-2023 13:24,  No significant change was found    Referred By:             Confirmed By:                                   Imaging Results              CT Head Without Contrast (Final result)  Result time 02/09/24 14:36:16      Final result by Jaciel Granados MD (02/09/24 14:36:16)                   Impression:      No acute intracranial abnormality identified.      Electronically signed by: Jaciel Granados  Date:    02/09/2024  Time:    14:36               Narrative:    EXAMINATION:  CT HEAD WITHOUT CONTRAST    CLINICAL HISTORY:  Headache, new or worsening (Age >= 50y);    TECHNIQUE:  Low dose axial images were obtained through the head.  Coronal and sagittal reformations were also performed. Contrast was not administered.    Automatic exposure control was utilized to reduce  the patient's radiation dose.    DLP= 987    COMPARISON:  11/01/2015    FINDINGS:  No acute intracranial hemorrhage, edema or mass. No acute parenchymal abnormality.    Diffuse cerebral atrophy with concordant ventricular enlargement.    There is normal gray white differentiation.    The osseous structures are normal.    The mastoid air cells are clear.    The auditory canals are patent bilaterally.    The globes and orbital contents are normal bilaterally.    The visualized maxillary, ethmoid and sphenoid sinuses are clear.                                       X-Ray Chest 1 View (Final result)  Result time 02/09/24 13:30:29      Final result by Jaciel Granados MD (02/09/24 13:30:29)                   Impression:      As above.  No adverse interval change.      Electronically signed by: Jaciel Granados  Date:    02/09/2024  Time:    13:30               Narrative:    EXAMINATION:  XR CHEST 1 VIEW    CLINICAL HISTORY:  cough;    TECHNIQUE:  Single view of the chest    COMPARISON:  10/23/2023    FINDINGS:  Emphysematous changes of the lungs again noted.  No focal opacification.  No pleural effusion or pneumothorax.                                       Medications   albuterol-ipratropium 2.5 mg-0.5 mg/3 mL nebulizer solution 3 mL (3 mLs Nebulization Given 2/9/24 1321)   butalbital-acetaminophen-caffeine -40 mg per tablet 2 tablet (2 tablets Oral Given 2/9/24 1245)     Medical Decision Making  Amount and/or Complexity of Data Reviewed  Independent Historian: spouse and EMS     Details: Stable VS as per paramedics, glucose 105 mg/dL.  Wife states he loss consciousness for some seconds, was shaking.  Labs: ordered. Decision-making details documented in ED Course.  Radiology: ordered and independent interpretation performed. Decision-making details documented in ED Course.  ECG/medicine tests: ordered and independent interpretation performed. Decision-making details documented in ED Course.    Risk  Prescription  drug management.      Additional MDM:   Differential Diagnosis:   Febrile seizure, epilepsy, brain mass, intoxication, medication side effect, stroke, dysrhythmia, among others                                     Clinical Impression:  Final diagnoses:  [R55] Syncope  [Z87.09] History of COPD (Primary)          ED Disposition Condition    Discharge Stable          ED Prescriptions    None       Follow-up Information       Follow up With Specialties Details Why Contact Info    [facility] Emergency Medicine  If symptoms worsen [address] [place] [id number] [phone]    [facility] [address] [facility] [id number]             Denzel De La Rosa MD  02/09/24 [id number]

## 2024-02-10 LAB — POCT GLUCOSE: 145 MG/DL (ref 70–110)

## 2024-02-14 LAB
OHS QRS DURATION: 88 MS
OHS QTC CALCULATION: 438 MS

## 2024-03-12 ENCOUNTER — DOCUMENTATION ONLY (OUTPATIENT)
Dept: RADIOLOGY | Facility: HOSPITAL | Age: 69
End: 2024-03-12
Payer: OTHER GOVERNMENT

## 2024-07-05 DIAGNOSIS — Z87.891 PERSONAL HISTORY OF TOBACCO USE, PRESENTING HAZARDS TO HEALTH: Primary | ICD-10-CM

## 2025-02-25 ENCOUNTER — HOSPITAL ENCOUNTER (EMERGENCY)
Facility: HOSPITAL | Age: 70
Discharge: HOME | End: 2025-02-25
Attending: EMERGENCY MEDICINE
Payer: OTHER GOVERNMENT

## 2025-02-25 VITALS
HEIGHT: 69 IN | HEART RATE: 93 BPM | RESPIRATION RATE: 20 BRPM | OXYGEN SATURATION: 96 % | WEIGHT: 213.94 LBS | BODY MASS INDEX: 31.69 KG/M2 | SYSTOLIC BLOOD PRESSURE: 132 MMHG | TEMPERATURE: 98 F | DIASTOLIC BLOOD PRESSURE: 78 MMHG

## 2025-02-25 DIAGNOSIS — K62.5 RECTAL BLEEDING: Primary | ICD-10-CM

## 2025-02-25 LAB
ALBUMIN SERPL-MCNC: 3.8 G/DL (ref 3.4–4.8)
ALBUMIN/GLOB SERPL: 1 RATIO (ref 1.1–2)
ALP SERPL-CCNC: 51 UNIT/L (ref 40–150)
ALT SERPL-CCNC: 15 UNIT/L (ref 0–55)
ANION GAP SERPL CALC-SCNC: 6 MEQ/L
AST SERPL-CCNC: 16 UNIT/L (ref 5–34)
BASOPHILS # BLD AUTO: 0.13 X10(3)/MCL
BASOPHILS NFR BLD AUTO: 1.5 %
BILIRUB SERPL-MCNC: 0.6 MG/DL
BUN SERPL-MCNC: 7.7 MG/DL (ref 8.4–25.7)
CALCIUM SERPL-MCNC: 9.1 MG/DL (ref 8.8–10)
CHLORIDE SERPL-SCNC: 105 MMOL/L (ref 98–107)
CO2 SERPL-SCNC: 27 MMOL/L (ref 23–31)
CREAT SERPL-MCNC: 0.89 MG/DL (ref 0.72–1.25)
CREAT/UREA NIT SERPL: 9
EOSINOPHIL # BLD AUTO: 0.37 X10(3)/MCL (ref 0–0.9)
EOSINOPHIL NFR BLD AUTO: 4.4 %
ERYTHROCYTE [DISTWIDTH] IN BLOOD BY AUTOMATED COUNT: 13.5 % (ref 11.5–17)
GFR SERPLBLD CREATININE-BSD FMLA CKD-EPI: >60 ML/MIN/1.73/M2
GLOBULIN SER-MCNC: 3.7 GM/DL (ref 2.4–3.5)
GLUCOSE SERPL-MCNC: 112 MG/DL (ref 82–115)
HCT VFR BLD AUTO: 44.5 % (ref 42–52)
HGB BLD-MCNC: 15.1 G/DL (ref 14–18)
HOLD SPECIMEN: NORMAL
IMM GRANULOCYTES # BLD AUTO: 0.02 X10(3)/MCL (ref 0–0.04)
IMM GRANULOCYTES NFR BLD AUTO: 0.2 %
LYMPHOCYTES # BLD AUTO: 2 X10(3)/MCL (ref 0.6–4.6)
LYMPHOCYTES NFR BLD AUTO: 23.8 %
MAGNESIUM SERPL-MCNC: 2 MG/DL (ref 1.6–2.6)
MCH RBC QN AUTO: 35 PG (ref 27–31)
MCHC RBC AUTO-ENTMCNC: 33.9 G/DL (ref 33–36)
MCV RBC AUTO: 103 FL (ref 80–94)
MONOCYTES # BLD AUTO: 0.7 X10(3)/MCL (ref 0.1–1.3)
MONOCYTES NFR BLD AUTO: 8.3 %
NEUTROPHILS # BLD AUTO: 5.19 X10(3)/MCL (ref 2.1–9.2)
NEUTROPHILS NFR BLD AUTO: 61.8 %
NRBC BLD AUTO-RTO: 0 %
PLATELET # BLD AUTO: 221 X10(3)/MCL (ref 130–400)
PMV BLD AUTO: 9.6 FL (ref 7.4–10.4)
POTASSIUM SERPL-SCNC: 3.8 MMOL/L (ref 3.5–5.1)
PROT SERPL-MCNC: 7.5 GM/DL (ref 5.8–7.6)
RBC # BLD AUTO: 4.32 X10(6)/MCL (ref 4.7–6.1)
SODIUM SERPL-SCNC: 138 MMOL/L (ref 136–145)
WBC # BLD AUTO: 8.41 X10(3)/MCL (ref 4.5–11.5)

## 2025-02-25 PROCEDURE — 36415 COLL VENOUS BLD VENIPUNCTURE: CPT | Performed by: EMERGENCY MEDICINE

## 2025-02-25 PROCEDURE — 94640 AIRWAY INHALATION TREATMENT: CPT

## 2025-02-25 PROCEDURE — 99283 EMERGENCY DEPT VISIT LOW MDM: CPT | Mod: 25

## 2025-02-25 PROCEDURE — 83735 ASSAY OF MAGNESIUM: CPT | Performed by: EMERGENCY MEDICINE

## 2025-02-25 PROCEDURE — 85025 COMPLETE CBC W/AUTO DIFF WBC: CPT | Performed by: EMERGENCY MEDICINE

## 2025-02-25 PROCEDURE — 80053 COMPREHEN METABOLIC PANEL: CPT | Performed by: EMERGENCY MEDICINE

## 2025-02-25 PROCEDURE — 25000242 PHARM REV CODE 250 ALT 637 W/ HCPCS: Performed by: EMERGENCY MEDICINE

## 2025-02-25 RX ORDER — DOCUSATE SODIUM 100 MG/1
100 CAPSULE, LIQUID FILLED ORAL 2 TIMES DAILY
Qty: 10 CAPSULE | Refills: 0 | Status: SHIPPED | OUTPATIENT
Start: 2025-02-25 | End: 2025-03-02

## 2025-02-25 RX ORDER — IPRATROPIUM BROMIDE AND ALBUTEROL SULFATE 2.5; .5 MG/3ML; MG/3ML
3 SOLUTION RESPIRATORY (INHALATION)
Status: COMPLETED | OUTPATIENT
Start: 2025-02-25 | End: 2025-02-25

## 2025-02-25 RX ADMIN — IPRATROPIUM BROMIDE AND ALBUTEROL SULFATE 3 ML: .5; 3 SOLUTION RESPIRATORY (INHALATION) at 01:02

## 2025-02-25 NOTE — ED PROVIDER NOTES
Encounter Date: 2/25/2025       History     Chief Complaint   Patient presents with    Rectal Bleeding     Bright red blood in stool since Sunday     Patient with a history of COPD hypertension colonic polyps with colonoscopy performed last year presents emergency department with bright red blood from rectum when taking bowel movement since Sunday.  Patient takes a baby aspirin otherwise no blood thinners.  No recent fevers illnesses denies any abdominal pain at this time the nausea vomiting.  No history of constipation though he states he does sometimes have to strain.  No recent traumas or falls thick account the patient's symptoms no rectal pain no cough congestion chest pain or shortness of breath      Review of patient's allergies indicates:   Allergen Reactions    Metformin Diarrhea and Other (See Comments)     Other reaction(s): Other     Past Medical History:   Diagnosis Date    Cancer     COPD (chronic obstructive pulmonary disease)     Hypertension     Personal history of colonic polyps      Past Surgical History:   Procedure Laterality Date    COLONOSCOPY  03/17/2017     No family history on file.  Social History[1]  Review of Systems   Gastrointestinal:  Positive for blood in stool.   All other systems reviewed and are negative.      Physical Exam     Initial Vitals [02/25/25 1256]   BP Pulse Resp Temp SpO2   127/76 94 20 98.1 °F (36.7 °C) 96 %      MAP       --         Physical Exam    Constitutional: He appears well-developed and well-nourished.   HENT:   Head: Normocephalic and atraumatic.   Eyes: EOM are normal. Pupils are equal, round, and reactive to light.   Neck:   Normal range of motion.  Cardiovascular:  Normal rate, regular rhythm and normal heart sounds.           Pulmonary/Chest: Breath sounds normal. No respiratory distress. He has no wheezes. He has no rales.   Abdominal: Abdomen is soft. Bowel sounds are normal. He exhibits no distension. There is no abdominal tenderness. There is no  rebound.   Genitourinary:    Genitourinary Comments: Chaperone present.  Digital rectal exam shows brown stool small colored stool.  Guaiac positive on test     Musculoskeletal:         General: Normal range of motion.      Cervical back: Normal range of motion.     Neurological: He is alert and oriented to person, place, and time.   Psychiatric: He has a normal mood and affect.         ED Course   Procedures  Labs Reviewed   COMPREHENSIVE METABOLIC PANEL - Abnormal       Result Value    Sodium 138      Potassium 3.8      Chloride 105      CO2 27      Glucose 112      Blood Urea Nitrogen 7.7 (*)     Creatinine 0.89      Calcium 9.1      Protein Total 7.5      Albumin 3.8      Globulin 3.7 (*)     Albumin/Globulin Ratio 1.0 (*)     Bilirubin Total 0.6      ALP 51      ALT 15      AST 16      eGFR >60      Anion Gap 6.0      BUN/Creatinine Ratio 9     CBC WITH DIFFERENTIAL - Abnormal    WBC 8.41      RBC 4.32 (*)     Hgb 15.1      Hct 44.5      .0 (*)     MCH 35.0 (*)     MCHC 33.9      RDW 13.5      Platelet 221      MPV 9.6      Neut % 61.8      Lymph % 23.8      Mono % 8.3      Eos % 4.4      Basophil % 1.5      Imm Grans % 0.2      Neut # 5.19      Lymph # 2.00      Mono # 0.70      Eos # 0.37      Baso # 0.13      Imm Gran # 0.02      NRBC% 0.0     MAGNESIUM - Normal    Magnesium Level 2.00     CBC W/ AUTO DIFFERENTIAL    Narrative:     The following orders were created for panel order CBC auto differential.  Procedure                               Abnormality         Status                     ---------                               -----------         ------                     CBC with Differential[[id number]]       Abnormal            Final result                 Please view results for these tests on the individual orders.   EXTRA TUBES    Narrative:     The following orders were created for panel order EXTRA TUBES.  Procedure                               Abnormality         Status                      ---------                               -----------         ------                     Light Blue Top Hold[[id number]]                             In process                 Light Green Top Hold[[id number]]                            In process                 Lavender Top Hold[[id number]]                               In process                 Gold Top Hold[[id number]]                                   In process                 Pink Top Hold[[id number]]                                   In process                   Please view results for these tests on the individual orders.   LIGHT BLUE TOP HOLD   LIGHT GREEN TOP HOLD   LAVENDER TOP HOLD   GOLD TOP HOLD   PINK TOP HOLD          Imaging Results    None          Medications   albuterol-ipratropium 2.5 mg-0.5 mg/3 mL nebulizer solution 3 mL (3 mLs Nebulization Given 2/25/25 1333)     Medical Decision Making  Amount and/or Complexity of Data Reviewed  Labs: ordered.    Risk  Prescription drug management.                          Medical Decision Making:   Initial Assessment:   Patient well-appearing no abdominal painless bright red blood blood in stool with no black per patient.  Clear lung fields no conjunctival pallor on examination with stable vitals.  Basic lab be obtained at this time  Differential Diagnosis:   Internal hemorrhoids, interval fissure diverticulosis, diverticulitis polyps colon cancer upper GI  ED Management:  Time 1:25 p.m.   Patient has a history of COPD in his asking for breathing treatment as he will miss his afternoon treatment being in the emergency department.  He has good air no crackles but will oblige.    Time 1:59 p.m. will  All labs within normal limits nonsignificant.  Hemoglobin within normal range.  Advise patient to use stool softeners will provide a prescription of Colace over the next several days with strict return precautions any development of pain in addition to continued bleeding from rectum especially with any fevers  greater than 100.4 F for an hour seek medical evaluation promptly otherwise follow up with his family doctor next week for medical re-evaluation and consideration of colonoscopy.             Clinical Impression:  Final diagnoses:  [K62.5] Rectal bleeding (Primary)          ED Disposition Condition    Discharge Stable          ED Prescriptions       Medication Sig Dispense Start Date End Date Auth. Provider    docusate sodium (COLACE) 100 MG capsule Take 1 capsule (100 mg total) by mouth 2 (two) times daily. for 5 days 10 capsule 2/25/2025 3/2/2025 Andrez Farris MD          Follow-up Information    None              [1]   Social History  Tobacco Use    Smoking status: Every Day     Current packs/day: 1.00     Types: Cigarettes    Smokeless tobacco: Never   Substance Use Topics    Drug use: Yes     Frequency: 2.0 times per week     Types: Marijuana        Andrez Farris MD  02/25/25 7243

## 2025-02-25 NOTE — DISCHARGE INSTRUCTIONS
Please use medications as prescribed for the next 5 days.    Follow up with the family doctor next week medical re-evaluation if symptoms are persistent.  You may need another colonoscopy if symptoms are persistent    However, if you begin to develop continue his pain your abdomen especially with increased bleeding from your rectum or development of dark black stools with red stools please seek medical evaluation promptly.    Anytime you have abdominal pain with any fevers greater than 100.4 F for more than an hour would be another indication to seek medical evaluation promptly.

## 2025-03-06 ENCOUNTER — HOSPITAL ENCOUNTER (EMERGENCY)
Facility: HOSPITAL | Age: 70
Discharge: HOME | End: 2025-03-06
Attending: EMERGENCY MEDICINE
Payer: OTHER GOVERNMENT

## 2025-03-06 VITALS
SYSTOLIC BLOOD PRESSURE: 129 MMHG | HEART RATE: 78 BPM | OXYGEN SATURATION: 97 % | TEMPERATURE: 99 F | RESPIRATION RATE: 16 BRPM | DIASTOLIC BLOOD PRESSURE: 85 MMHG

## 2025-03-06 DIAGNOSIS — K92.2 GASTROINTESTINAL HEMORRHAGE, UNSPECIFIED GASTROINTESTINAL HEMORRHAGE TYPE: Primary | ICD-10-CM

## 2025-03-06 DIAGNOSIS — R06.02 SHORTNESS OF BREATH: ICD-10-CM

## 2025-03-06 LAB
ALBUMIN SERPL-MCNC: 4 G/DL (ref 3.4–4.8)
ALBUMIN/GLOB SERPL: 1.1 RATIO (ref 1.1–2)
ALP SERPL-CCNC: 64 UNIT/L (ref 40–150)
ALT SERPL-CCNC: 18 UNIT/L (ref 0–55)
ANION GAP SERPL CALC-SCNC: 11 MEQ/L
APTT PPP: 29.7 SECONDS (ref 23.2–33.7)
AST SERPL-CCNC: 27 UNIT/L (ref 5–34)
BACTERIA #/AREA URNS AUTO: ABNORMAL /HPF
BASOPHILS # BLD AUTO: 0.14 X10(3)/MCL
BASOPHILS NFR BLD AUTO: 1.4 %
BILIRUB SERPL-MCNC: 0.4 MG/DL
BILIRUB UR QL STRIP.AUTO: NEGATIVE
BNP BLD-MCNC: 24.3 PG/ML
BUN SERPL-MCNC: 7.4 MG/DL (ref 8.4–25.7)
CALCIUM SERPL-MCNC: 9.3 MG/DL (ref 8.8–10)
CHLORIDE SERPL-SCNC: 103 MMOL/L (ref 98–107)
CLARITY UR: CLEAR
CO2 SERPL-SCNC: 27 MMOL/L (ref 23–31)
COLOR UR AUTO: COLORLESS
CREAT SERPL-MCNC: 0.97 MG/DL (ref 0.72–1.25)
CREAT/UREA NIT SERPL: 8
EOSINOPHIL # BLD AUTO: 0.54 X10(3)/MCL (ref 0–0.9)
EOSINOPHIL NFR BLD AUTO: 5.4 %
ERYTHROCYTE [DISTWIDTH] IN BLOOD BY AUTOMATED COUNT: 13.8 % (ref 11.5–17)
GFR SERPLBLD CREATININE-BSD FMLA CKD-EPI: >60 ML/MIN/1.73/M2
GLOBULIN SER-MCNC: 3.8 GM/DL (ref 2.4–3.5)
GLUCOSE SERPL-MCNC: 69 MG/DL (ref 82–115)
GLUCOSE UR QL STRIP: NORMAL
HCT VFR BLD AUTO: 46.2 % (ref 42–52)
HGB BLD-MCNC: 15.7 G/DL (ref 14–18)
HGB UR QL STRIP: ABNORMAL
IMM GRANULOCYTES # BLD AUTO: 0.03 X10(3)/MCL (ref 0–0.04)
IMM GRANULOCYTES NFR BLD AUTO: 0.3 %
INR PPP: 1
KETONES UR QL STRIP: NEGATIVE
LEUKOCYTE ESTERASE UR QL STRIP: 25
LYMPHOCYTES # BLD AUTO: 3.02 X10(3)/MCL (ref 0.6–4.6)
LYMPHOCYTES NFR BLD AUTO: 30.4 %
MCH RBC QN AUTO: 35.3 PG (ref 27–31)
MCHC RBC AUTO-ENTMCNC: 34 G/DL (ref 33–36)
MCV RBC AUTO: 103.8 FL (ref 80–94)
MONOCYTES # BLD AUTO: 0.83 X10(3)/MCL (ref 0.1–1.3)
MONOCYTES NFR BLD AUTO: 8.4 %
MUCOUS THREADS URNS QL MICRO: ABNORMAL /LPF
NEUTROPHILS # BLD AUTO: 5.36 X10(3)/MCL (ref 2.1–9.2)
NEUTROPHILS NFR BLD AUTO: 54.1 %
NITRITE UR QL STRIP: NEGATIVE
NRBC BLD AUTO-RTO: 0 %
PH UR STRIP: 5.5 [PH]
PLATELET # BLD AUTO: 208 X10(3)/MCL (ref 130–400)
PMV BLD AUTO: 9.9 FL (ref 7.4–10.4)
POCT GLUCOSE: 80 MG/DL (ref 70–110)
POTASSIUM SERPL-SCNC: 4 MMOL/L (ref 3.5–5.1)
PROT SERPL-MCNC: 7.8 GM/DL (ref 5.8–7.6)
PROT UR QL STRIP: NEGATIVE
PROTHROMBIN TIME: 13.3 SECONDS (ref 12.5–14.5)
RBC # BLD AUTO: 4.45 X10(6)/MCL (ref 4.7–6.1)
RBC #/AREA URNS AUTO: ABNORMAL /HPF
SODIUM SERPL-SCNC: 141 MMOL/L (ref 136–145)
SP GR UR STRIP.AUTO: 1 (ref 1–1.03)
SQUAMOUS #/AREA URNS LPF: ABNORMAL /HPF
TROPONIN I SERPL-MCNC: <0.01 NG/ML (ref 0–0.04)
UROBILINOGEN UR STRIP-ACNC: NORMAL
WBC # BLD AUTO: 9.92 X10(3)/MCL (ref 4.5–11.5)
WBC #/AREA URNS AUTO: ABNORMAL /HPF

## 2025-03-06 PROCEDURE — 85025 COMPLETE CBC W/AUTO DIFF WBC: CPT | Performed by: NURSE PRACTITIONER

## 2025-03-06 PROCEDURE — 83880 ASSAY OF NATRIURETIC PEPTIDE: CPT | Performed by: NURSE PRACTITIONER

## 2025-03-06 PROCEDURE — 85730 THROMBOPLASTIN TIME PARTIAL: CPT | Performed by: NURSE PRACTITIONER

## 2025-03-06 PROCEDURE — 93005 ELECTROCARDIOGRAM TRACING: CPT

## 2025-03-06 PROCEDURE — 99285 EMERGENCY DEPT VISIT HI MDM: CPT | Mod: 25

## 2025-03-06 PROCEDURE — 93010 ELECTROCARDIOGRAM REPORT: CPT | Mod: ,,, | Performed by: STUDENT IN AN ORGANIZED HEALTH CARE EDUCATION/TRAINING PROGRAM

## 2025-03-06 PROCEDURE — 82962 GLUCOSE BLOOD TEST: CPT

## 2025-03-06 PROCEDURE — 80053 COMPREHEN METABOLIC PANEL: CPT | Performed by: NURSE PRACTITIONER

## 2025-03-06 PROCEDURE — 81015 MICROSCOPIC EXAM OF URINE: CPT | Performed by: NURSE PRACTITIONER

## 2025-03-06 PROCEDURE — 84484 ASSAY OF TROPONIN QUANT: CPT | Performed by: NURSE PRACTITIONER

## 2025-03-06 PROCEDURE — 85610 PROTHROMBIN TIME: CPT | Performed by: NURSE PRACTITIONER

## 2025-03-06 RX ORDER — PANTOPRAZOLE SODIUM 40 MG/1
40 TABLET, DELAYED RELEASE ORAL DAILY
Qty: 30 TABLET | Refills: 11 | Status: SHIPPED | OUTPATIENT
Start: 2025-03-06 | End: 2026-03-06

## 2025-03-06 NOTE — DISCHARGE INSTRUCTIONS
I have sent you referrals to the [facility] as well as to the private physician on-call.  They should be calling you to set up an outpatient appointment.  If the bleeding gets worse or you develop any severe pain shortness of breath dizziness lightheadedness chest pain police returned to the emergency department.  Did not drink any alcohol in his avoid anti-inflammatories like ibuprofen or naproxen

## 2025-03-06 NOTE — FIRST PROVIDER EVALUATION
Continue prednisone   Medical screening examination initiated.  I have conducted a focused provider triage encounter, findings are as follows:    Brief history of present illness:  Patient states rectal bleeding x 2 weeks. Patient states SOB.    There were no vitals filed for this visit.    Pertinent physical exam:  Awake, alert, ambulatory      Brief workup plan:  Labs    Preliminary workup initiated; this workup will be continued and followed by the physician or advanced practice provider that is assigned to the patient when roomed.

## 2025-03-06 NOTE — ED PROVIDER NOTES
Encounter Date: 3/6/2025    SCRIBE #1 NOTE: I, Anirudh Hagen, am scribing for, and in the presence of,  Avery Sandoval MD. I have scribed the entire note.       History     Chief Complaint   Patient presents with    Rectal Bleeding     Pt reports rectal bleeding and sob x weeks. Unknown of bright. Denies rectal pain. -BT.      69 year old male with a hx of cancer, COPD, and HTN presents to the ED for rectal bleeding. Pt states he has had blood in his stool for the last 2 weeks. Pt also notes intermittent episodes of abdominal pain over this time. Pt denies any chest pain or SOB. Pt takes Aspirin daily. Pt is not on a blood thinner.  He states the pain in his not bad at all she is barely noticeable.  Not having pain at present went to [facility] about a week and a half ago was evaluated and discharged home.  Patient states he had a colonoscopy a year ago had some polyps but nothing concerning    The history is provided by the patient. No  was used.     Review of patient's allergies indicates:   Allergen Reactions    Metformin Diarrhea and Other (See Comments)     Other reaction(s): Other     Past Medical History:   Diagnosis Date    Cancer     COPD (chronic obstructive pulmonary disease)     Hypertension     Personal history of colonic polyps      Past Surgical History:   Procedure Laterality Date    COLONOSCOPY  03/17/2017     No family history on file.  Social History[1]  Review of Systems   Constitutional:  Negative for chills and fever.   Respiratory:  Negative for cough and shortness of breath.    Cardiovascular:  Negative for chest pain.   Gastrointestinal:  Positive for abdominal pain and blood in stool. Negative for nausea and vomiting.   Musculoskeletal:  Negative for myalgias.   Neurological:  Negative for syncope.   All other systems reviewed and are negative.      Physical Exam     Initial Vitals [03/06/25 1512]   BP Pulse Resp Temp SpO2   137/84 87 20 99.3 °F (37.4 °C) (!) 93 %      MAP        --         Physical Exam    Constitutional: He appears well-developed and well-nourished. No distress.   HENT:   Head: Normocephalic and atraumatic.   Cardiovascular:  Normal rate.           Pulmonary/Chest: No respiratory distress. He has no wheezes. He has no rhonchi. He exhibits no tenderness.   Abdominal: Abdomen is soft. He exhibits no distension. There is no abdominal tenderness. There is no rebound and no guarding.   Genitourinary:    Genitourinary Comments: Rectal exam done. Light brown stool that is red tinged. Hemoccult positive.      Musculoskeletal:         General: Normal range of motion.     Neurological: He is alert and oriented to person, place, and time. He has normal strength. GCS score is 15. GCS eye subscore is 4. GCS verbal subscore is 5. GCS motor subscore is 6.   Skin: Skin is warm and dry.   Psychiatric: He has a normal mood and affect.         ED Course   Procedures  Labs Reviewed   COMPREHENSIVE METABOLIC PANEL - Abnormal       Result Value    Sodium 141      Potassium 4.0      Chloride 103      CO2 27      Glucose 69 (*)     Blood Urea Nitrogen 7.4 (*)     Creatinine 0.97      Calcium 9.3      Protein Total 7.8 (*)     Albumin 4.0      Globulin 3.8 (*)     Albumin/Globulin Ratio 1.1      Bilirubin Total 0.4      ALP 64      ALT 18      AST 27      eGFR >60      Anion Gap 11.0      BUN/Creatinine Ratio 8     URINALYSIS, REFLEX TO URINE CULTURE - Abnormal    Color, UA Colorless      Appearance, UA Clear      Specific Gravity, UA 1.003 (*)     pH, UA 5.5      Protein, UA Negative      Glucose, UA Normal      Ketones, UA Negative      Blood, UA Trace (*)     Bilirubin, UA Negative      Urobilinogen, UA Normal      Nitrites, UA Negative      Leukocyte Esterase, UA 25 (*)     RBC, UA 0-5      WBC, UA 0-5      Bacteria, UA None Seen      Squamous Epithelial Cells, UA None Seen      Mucous, UA Trace (*)    CBC WITH DIFFERENTIAL - Abnormal    WBC 9.92      RBC 4.45 (*)     Hgb 15.7      Hct 46.2       .8 (*)     MCH 35.3 (*)     MCHC 34.0      RDW 13.8      Platelet 208      MPV 9.9      Neut % 54.1      Lymph % 30.4      Mono % 8.4      Eos % 5.4      Basophil % 1.4      Imm Grans % 0.3      Neut # 5.36      Lymph # 3.02      Mono # 0.83      Eos # 0.54      Baso # 0.14      Imm Gran # 0.03      NRBC% 0.0     APTT - Normal    PTT 29.7     PROTIME-INR - Normal    PT 13.3      INR 1.0      Narrative:     Protimes are used to monitor anticoagulant agents such as warfarin. PT INR values are based on the current patient normal mean and the EUGENIO value for the specific instrument reagent used.  **Routine theraputic target values for the INR are 2.0-3.0**   B-TYPE NATRIURETIC PEPTIDE - Normal    Natriuretic Peptide 24.3     TROPONIN I - Normal    Troponin-I <0.010     CBC W/ AUTO DIFFERENTIAL    Narrative:     The following orders were created for panel order CBC Auto Differential.  Procedure                               Abnormality         Status                     ---------                               -----------         ------                     CBC with Differential[[id number]]       Abnormal            Final result                 Please view results for these tests on the individual orders.   POCT GLUCOSE    POCT Glucose 80            Imaging Results              X-Ray Chest PA And Lateral (Final result)  Result time 03/06/25 15:40:58      Final result by Gerald Santana MD (03/06/25 15:40:58)                   Impression:      Changes as described above, stable from the previous exam      Electronically signed by: Gerald Santana MD  Date:    03/06/2025  Time:    15:40               Narrative:    EXAMINATION:  XR CHEST PA AND LATERAL    CLINICAL HISTORY:  SOB;    TECHNIQUE:  PA and lateral views of the chest were performed.    COMPARISON:  02/09/2024    FINDINGS:  There faint increased markings on the right not appreciably changed from the previous study.  Heart size is within normal limits.  There  is vascular calcification noted.                                       Medications - No data to display  Medical Decision Making  The differential diagnosis includes, but is not limited to, upper GI bleed or lower GI bleed.     H&H is stable and actually a bit higher than it was 9 days ago.  No blood thinners except daily aspirin.  Patient states he did not have referral sent for GI.  He states he has been having this intermittently for 2 weeks H&H is stable no indication for admission at this time will send referral    Problems Addressed:  Gastrointestinal hemorrhage, unspecified gastrointestinal hemorrhage type: acute illness or injury    Amount and/or Complexity of Data Reviewed  Labs: ordered.  Radiology: ordered and independent interpretation performed.            Scribe Attestation:   Scribe #1: I performed the above scribed service and the documentation accurately describes the services I performed. I attest to the accuracy of the note.    Attending Attestation:           Physician Attestation for Scribe:  Physician Attestation Statement for Scribe #1: I, Avery Sandoval MD, reviewed documentation, as scribed by Anirudh Hagen in my presence, and it is both accurate and complete.                                    Clinical Impression:  Final diagnoses:  [R06.02] Shortness of breath  [K92.2] Gastrointestinal hemorrhage, unspecified gastrointestinal hemorrhage type (Primary)          ED Disposition Condition    Discharge Stable          ED Prescriptions       Medication Sig Dispense Start Date End Date Auth. Provider    pantoprazole (PROTONIX) 40 MG tablet Take 1 tablet (40 mg total) by mouth once daily. 30 tablet 3/6/2025 3/6/2026 Avery Sandoval MD          Follow-up Information       Follow up With Specialties Details Why Contact Info    Primary care provider   You can call [phone] to get set up with a local primary care provider within the next few days.If your symptoms worsen or change please return  to the emergency department for re-evaluation Call your primary care provider to schedule a follow-up appointment within a week    Kar Burden MD Gastroenterology Schedule an appointment as soon as possible for a visit   [address]  [place] [address]  [phone]                   [1]   Social History  Tobacco Use    Smoking status: Every Day     Current packs/day: 1.00     Types: Cigarettes    Smokeless tobacco: Never   Substance Use Topics    Drug use: Yes     Frequency: 2.0 times per week     Types: Marijuana        Avery Sandoval MD  03/06/25 7006

## 2025-03-07 ENCOUNTER — DOCUMENTATION ONLY (OUTPATIENT)
Dept: CASE MANAGEMENT | Facility: HOSPITAL | Age: 70
End: 2025-03-07
Payer: OTHER GOVERNMENT

## 2025-03-07 LAB
OHS QRS DURATION: 84 MS
OHS QTC CALCULATION: 440 MS

## 2025-03-07 NOTE — PROGRESS NOTES
Referral sent to [facility] but needing [facility] authorization prior to scheduling. Therefore, sent referral to [facility] with the [facility] to coordinate follow up care.

## 2025-04-10 RX ORDER — POLYETHYLENE GLYCOL 3350, SODIUM SULFATE, SODIUM CHLORIDE, POTASSIUM CHLORIDE, SODIUM ASCORBATE, AND ASCORBIC ACID 7.5-2.691G
KIT ORAL
Qty: 1 KIT | Refills: 0 | Status: SHIPPED | OUTPATIENT
Start: 2025-04-10

## 2025-06-16 ENCOUNTER — HOSPITAL ENCOUNTER (EMERGENCY)
Facility: HOSPITAL | Age: 70
Discharge: HOME | End: 2025-06-16
Attending: FAMILY MEDICINE
Payer: OTHER GOVERNMENT

## 2025-06-16 VITALS
TEMPERATURE: 98 F | DIASTOLIC BLOOD PRESSURE: 75 MMHG | HEIGHT: 69 IN | BODY MASS INDEX: 31.5 KG/M2 | WEIGHT: 212.69 LBS | RESPIRATION RATE: 18 BRPM | OXYGEN SATURATION: 97 % | SYSTOLIC BLOOD PRESSURE: 122 MMHG | HEART RATE: 88 BPM

## 2025-06-16 DIAGNOSIS — L03.312 CELLULITIS OF BACK EXCEPT BUTTOCK: Primary | ICD-10-CM

## 2025-06-16 PROCEDURE — 99283 EMERGENCY DEPT VISIT LOW MDM: CPT

## 2025-06-16 RX ORDER — IBUPROFEN 600 MG/1
600 TABLET, FILM COATED ORAL EVERY 6 HOURS PRN
Qty: 20 TABLET | Refills: 0 | Status: SHIPPED | OUTPATIENT
Start: 2025-06-16

## 2025-06-16 RX ORDER — AMOXICILLIN AND CLAVULANATE POTASSIUM 875; 125 MG/1; MG/1
1 TABLET, FILM COATED ORAL 2 TIMES DAILY
Qty: 14 TABLET | Refills: 0 | Status: SHIPPED | OUTPATIENT
Start: 2025-06-16 | End: 2025-06-20 | Stop reason: ALTCHOICE

## 2025-06-16 NOTE — ED PROVIDER NOTES
"Encounter Date: 6/16/2025       History     Chief Complaint   Patient presents with    Abscess     Pt arrived to ED with c/o abscess to back x 3days. Tried to "pop" it at home but still has some in it.      69-year-old presents said he had says on his back that popped and drained nice still has some erythema no fevers or chills on exam he has some tenderness to palpation consistent with cellulitis discussed treatment plan with the patient        Review of patient's allergies indicates:   Allergen Reactions    Metformin Diarrhea and Other (See Comments)     Other reaction(s): Other     Past Medical History:   Diagnosis Date    Cancer     COPD (chronic obstructive pulmonary disease)     Hypertension     Personal history of colonic polyps      Past Surgical History:   Procedure Laterality Date    COLONOSCOPY  03/17/2017     No family history on file.  Social History[1]  Review of Systems   Skin:  Positive for color change.   All other systems reviewed and are negative.      Physical Exam     Initial Vitals [06/16/25 1825]   BP Pulse Resp Temp SpO2   129/74 90 20 98.2 °F (36.8 °C) 95 %      MAP       --         Physical Exam    Nursing note and vitals reviewed.  Constitutional: He appears well-developed and well-nourished. He is active.   HENT:   Head: Normocephalic and atraumatic.   Eyes: Conjunctivae, EOM and lids are normal. Pupils are equal, round, and reactive to light.   Neck: Trachea normal and phonation normal. Neck supple. No thyroid mass present.   Normal range of motion.  Cardiovascular:  Normal rate, regular rhythm, normal heart sounds and normal pulses.           Pulmonary/Chest: Breath sounds normal.   Abdominal: Abdomen is soft. Bowel sounds are normal.   Musculoskeletal:         General: Normal range of motion.      Cervical back: Normal range of motion and neck supple.     Neurological: He is alert and oriented to person, place, and time. He has normal strength and normal reflexes.   Skin: Skin is warm " and intact.   Psychiatric: He has a normal mood and affect. His speech is normal and behavior is normal. Judgment and thought content normal. Cognition and memory are normal.         ED Course   Procedures  Labs Reviewed - No data to display       Imaging Results    None          Medications - No data to display  Medical Decision Making  69-year-old presents said he had says on his back that popped and drained nice still has some erythema no fevers or chills on exam he has some tenderness to palpation consistent with cellulitis discussed treatment plan with the patient          Risk  Prescription drug management.  Risk Details: Differential diagnosis abscesses versus cellulitis                                      Clinical Impression:  Final diagnoses:  [L03.312] Cellulitis of back except buttock (Primary)          ED Disposition Condition    Discharge Stable          ED Prescriptions       Medication Sig Dispense Start Date End Date Auth. Provider    amoxicillin-clavulanate 875-125mg (AUGMENTIN) 875-125 mg per tablet Take 1 tablet by mouth 2 (two) times daily. 14 tablet 6/16/2025 -- Javid Baumann MD    ibuprofen (ADVIL,MOTRIN) 600 MG tablet Take 1 tablet (600 mg total) by mouth every 6 (six) hours as needed for Pain. 20 tablet 6/16/2025 -- Javid Baumann MD          Follow-up Information       Follow up With Specialties Details Why Contact Info    [facility] Dept Emergency Medicine In 3 days  [address] [facility] [address] [phone]                   [1]   Social History  Tobacco Use    Smoking status: Every Day     Current packs/day: 1.00     Types: Cigarettes    Smokeless tobacco: Never   Substance Use Topics    Drug use: Yes     Frequency: 2.0 times per week     Types: Marijuana        Javid Baumann MD  06/16/25 0793

## 2025-06-20 ENCOUNTER — HOSPITAL ENCOUNTER (EMERGENCY)
Facility: HOSPITAL | Age: 70
Discharge: HOME | End: 2025-06-20
Attending: INTERNAL MEDICINE
Payer: OTHER GOVERNMENT

## 2025-06-20 VITALS
HEIGHT: 69 IN | DIASTOLIC BLOOD PRESSURE: 80 MMHG | TEMPERATURE: 98 F | OXYGEN SATURATION: 95 % | RESPIRATION RATE: 20 BRPM | BODY MASS INDEX: 31.7 KG/M2 | WEIGHT: 214 LBS | SYSTOLIC BLOOD PRESSURE: 125 MMHG | HEART RATE: 101 BPM

## 2025-06-20 DIAGNOSIS — L02.219 CELLULITIS AND ABSCESS OF TRUNK: Primary | ICD-10-CM

## 2025-06-20 DIAGNOSIS — L03.319 CELLULITIS AND ABSCESS OF TRUNK: Primary | ICD-10-CM

## 2025-06-20 PROCEDURE — 99283 EMERGENCY DEPT VISIT LOW MDM: CPT

## 2025-06-20 RX ORDER — SULFAMETHOXAZOLE AND TRIMETHOPRIM 800; 160 MG/1; MG/1
1 TABLET ORAL 2 TIMES DAILY
Qty: 14 TABLET | Refills: 0 | Status: ON HOLD | OUTPATIENT
Start: 2025-06-20 | End: 2025-06-27

## 2025-06-20 RX ORDER — IBUPROFEN 800 MG/1
800 TABLET, FILM COATED ORAL EVERY 6 HOURS PRN
Qty: 20 TABLET | Refills: 0 | Status: ON HOLD | OUTPATIENT
Start: 2025-06-20

## 2025-06-21 NOTE — ED PROVIDER NOTES
Encounter Date: 6/20/2025       History     Chief Complaint   Patient presents with    Abscess     Abscess on back, was seen on Monday in ER and was placed on antibiotic, wife states that the abscess looks worse and wants it reevaluated     A 69 y.o. male patient with a history of COPD, HTN presents to the ED with draining abscess of left back. The onset is 4 days ago. Patient initially came when it started to the ED when it was just red skin. He was diagnosed with cellulitis and started on Augmentin. Patient returns today because it started draining significant amount of purulent fluid. Wife became concerned this means it is worse. Patient denies increase in pain, fever, chills. Patient states ibuprofen is helping with the pain.       The history is provided by the patient.     Review of patient's allergies indicates:   Allergen Reactions    Metformin Diarrhea and Other (See Comments)     Other reaction(s): Other     Past Medical History:   Diagnosis Date    Cancer     COPD (chronic obstructive pulmonary disease)     Hypertension     Personal history of colonic polyps      Past Surgical History:   Procedure Laterality Date    COLONOSCOPY  03/17/2017     No family history on file.  Social History[1]  Review of Systems   Constitutional:  Negative for chills and fever.   Eyes:  Negative for visual disturbance.   Respiratory:  Negative for shortness of breath.    Cardiovascular:  Negative for chest pain.   Gastrointestinal:  Negative for nausea and vomiting.   Genitourinary:  Negative for difficulty urinating and dysuria.   Musculoskeletal:  Negative for arthralgias.   Skin:  Positive for color change, rash and wound.   Neurological:  Negative for weakness and headaches.   Hematological:  Does not bruise/bleed easily.   Psychiatric/Behavioral:  Negative for confusion.    All other systems reviewed and are negative.      Physical Exam     Initial Vitals [06/20/25 1838]   BP Pulse Resp Temp SpO2   125/80 101 20 98.2 °F  (36.8 °C) 95 %      MAP       --         Physical Exam    Nursing note and vitals reviewed.  Constitutional: He appears well-developed and well-nourished.   HENT:   Head: Normocephalic and atraumatic.   Right Ear: External ear normal.   Left Ear: External ear normal.   Nose: Nose normal.   Eyes: Conjunctivae and EOM are normal.   Neck: Neck supple.   Cardiovascular:  Normal rate, regular rhythm and normal heart sounds.     Exam reveals no gallop and no friction rub.       No murmur heard.  Pulmonary/Chest: Breath sounds normal. No respiratory distress. He has no wheezes. He has no rhonchi. He has no rales.   Abdominal: He exhibits no distension.   Musculoskeletal:      Cervical back: Neck supple.     Neurological: He is alert and oriented to person, place, and time. GCS eye subscore is 4. GCS verbal subscore is 5. GCS motor subscore is 6.   Skin: Skin is warm and dry. Capillary refill takes less than 2 seconds. Abscess noted. There is erythema.        Actively draining abscess to left mid-back with surrounding erythema.   Upon pressure, pus is expressed until no more purulent fluid is expressible.            ED Course   Procedures  Labs Reviewed - No data to display       Imaging Results    None          Medications - No data to display  Medical Decision Making  A 69 y.o. male patient with a history of COPD, HTN presents to the ED with draining abscess of left back. The onset is 4 days ago. Patient initially came when it started to the ED when it was just red skin. He was diagnosed with cellulitis and started on Augmentin. Patient returns today because it started draining significant amount of purulent fluid. Wife became concerned this means it is worse. Patient denies increase in pain, fever, chills. Patient states ibuprofen is helping with the pain.     Expression of remaining purulent material done manually without need for incision.   Patient's antibiotics changed from Augmentin to bactrim due to purulent  material. Patient given strict return precautions.     Clinical impression:  Cellulitis and abscess of trunk (Primary)    Patient is non-toxic appearing and tolerating nutritional intake. Patient's vital signs and clinical condition are stable for DC with ED Prescriptions     Medication Sig Dispense Start Date End Date Auth. Provider    sulfamethoxazole-trimethoprim 800-160mg (BACTRIM DS) 800-160 mg Tab Take   1 tablet by mouth 2 (two) times daily. for 7 days 14 tablet 6/20/2025 6/27/2025 Magda Briceno PA    ibuprofen (ADVIL,MOTRIN) 800 MG tablet Take 1 tablet (800 mg total) by   mouth every 6 (six) hours as needed for Pain. 20 tablet 6/20/2025 --   Magda Briceno PA         Follow-up: PCP or Internal medicine clinic within 3 days  Referrals made: none    Strict follow-up precautions given. Patient verbalizes understanding of treatment plan and ED return precautions.       Risk  Prescription drug management.  Risk Details: Given strict ED return precautions. I have spoken with the patient and/or caregivers. I have explained the patient's condition, diagnoses and treatment plan based on the information available to me at this time. I have answered the patient's and/or caregiver's questions and addressed any concerns. The patient and/or caregivers have as good an understanding of the patient's diagnosis, condition and treatment plan as can be expected at this point. The patient's condition is stable and appropriate for discharge from the emergency department.      The patient will pursue further outpatient evaluation with the primary care physician or other designated or consulting physician as outlined in the discharge instructions. The patient and/or caregivers are agreeable to this plan of care and follow-up instructions have been explained in detail. The patient and/or caregivers have received these instructions in written format and have expressed an understanding of the discharge instructions. The  patient and/or caregivers are aware that any significant change in condition or worsening of symptoms should prompt an immediate return to this or the closest emergency department or a call to 911.               Additional MDM:   Differential Diagnosis:   Other: The following diagnoses were also considered and will be evaluated: dermatitis, abscess and cellulitis.                                   Clinical Impression:  Final diagnoses:  [L03.319, L02.219] Cellulitis and abscess of trunk (Primary)          ED Disposition Condition    Discharge Stable          ED Prescriptions       Medication Sig Dispense Start Date End Date Auth. Provider    sulfamethoxazole-trimethoprim 800-160mg (BACTRIM DS) 800-160 mg Tab Take 1 tablet by mouth 2 (two) times daily. for 7 days 14 tablet 6/20/2025 6/27/2025 Magda Briceno PA    ibuprofen (ADVIL,MOTRIN) 800 MG tablet Take 1 tablet (800 mg total) by mouth every 6 (six) hours as needed for Pain. 20 tablet 6/20/2025 -- Magda Briceno PA          Follow-up Information       Follow up With Specialties Details Why Contact Info    [facility] Dept Emergency Medicine Go to  If symptoms worsen, As needed [address] [facility] [address] [phone]    Your primary care provider  Go in 3 days                     [1]   Social History  Tobacco Use    Smoking status: Every Day     Current packs/day: 1.00     Types: Cigarettes    Smokeless tobacco: Never   Substance Use Topics    Drug use: Yes     Frequency: 2.0 times per week     Types: Marijuana        Magda Briceno PA  06/20/25 4136

## 2025-06-27 ENCOUNTER — HOSPITAL ENCOUNTER (INPATIENT)
Facility: HOSPITAL | Age: 70
LOS: 3 days | Discharge: HOME HEALTH | DRG: 603 | End: 2025-06-30
Attending: INTERNAL MEDICINE | Admitting: INTERNAL MEDICINE
Payer: OTHER GOVERNMENT

## 2025-06-27 DIAGNOSIS — L03.312 CELLULITIS OF BACK: ICD-10-CM

## 2025-06-27 LAB
ALBUMIN SERPL-MCNC: 3.6 G/DL (ref 3.4–4.8)
ALBUMIN/GLOB SERPL: 0.9 RATIO (ref 1.1–2)
ALP SERPL-CCNC: 53 UNIT/L (ref 40–150)
ALT SERPL-CCNC: 18 UNIT/L (ref 0–55)
ANION GAP SERPL CALC-SCNC: 9 MEQ/L
AST SERPL-CCNC: 28 UNIT/L (ref 11–45)
BASOPHILS # BLD AUTO: 0.12 X10(3)/MCL
BASOPHILS NFR BLD AUTO: 1.3 %
BILIRUB SERPL-MCNC: 0.2 MG/DL
BUN SERPL-MCNC: 7.3 MG/DL (ref 8.4–25.7)
CALCIUM SERPL-MCNC: 9 MG/DL (ref 8.8–10)
CHLORIDE SERPL-SCNC: 104 MMOL/L (ref 98–107)
CO2 SERPL-SCNC: 23 MMOL/L (ref 23–31)
CREAT SERPL-MCNC: 1.06 MG/DL (ref 0.72–1.25)
CREAT/UREA NIT SERPL: 7
EOSINOPHIL # BLD AUTO: 0.54 X10(3)/MCL (ref 0–0.9)
EOSINOPHIL NFR BLD AUTO: 6 %
ERYTHROCYTE [DISTWIDTH] IN BLOOD BY AUTOMATED COUNT: 13.6 % (ref 11.5–17)
GFR SERPLBLD CREATININE-BSD FMLA CKD-EPI: >60 ML/MIN/1.73/M2
GLOBULIN SER-MCNC: 4.2 GM/DL (ref 2.4–3.5)
GLUCOSE SERPL-MCNC: 121 MG/DL (ref 82–115)
HCT VFR BLD AUTO: 45 % (ref 42–52)
HGB BLD-MCNC: 14.8 G/DL (ref 14–18)
IMM GRANULOCYTES # BLD AUTO: 0.07 X10(3)/MCL (ref 0–0.04)
IMM GRANULOCYTES NFR BLD AUTO: 0.8 %
LACTATE SERPL-SCNC: 1.6 MMOL/L (ref 0.5–2.2)
LYMPHOCYTES # BLD AUTO: 1.89 X10(3)/MCL (ref 0.6–4.6)
LYMPHOCYTES NFR BLD AUTO: 21 %
MCH RBC QN AUTO: 34.7 PG (ref 27–31)
MCHC RBC AUTO-ENTMCNC: 32.9 G/DL (ref 33–36)
MCV RBC AUTO: 105.6 FL (ref 80–94)
MONOCYTES # BLD AUTO: 0.79 X10(3)/MCL (ref 0.1–1.3)
MONOCYTES NFR BLD AUTO: 8.8 %
NEUTROPHILS # BLD AUTO: 5.59 X10(3)/MCL (ref 2.1–9.2)
NEUTROPHILS NFR BLD AUTO: 62.1 %
NRBC BLD AUTO-RTO: 0 %
PLATELET # BLD AUTO: 236 X10(3)/MCL (ref 130–400)
PMV BLD AUTO: 9.4 FL (ref 7.4–10.4)
POCT GLUCOSE: 172 MG/DL (ref 70–110)
POTASSIUM SERPL-SCNC: 3.8 MMOL/L (ref 3.5–5.1)
PROT SERPL-MCNC: 7.8 GM/DL (ref 5.8–7.6)
RBC # BLD AUTO: 4.26 X10(6)/MCL (ref 4.7–6.1)
SODIUM SERPL-SCNC: 136 MMOL/L (ref 136–145)
WBC # BLD AUTO: 9 X10(3)/MCL (ref 4.5–11.5)

## 2025-06-27 PROCEDURE — 25000242 PHARM REV CODE 250 ALT 637 W/ HCPCS: Performed by: NURSE PRACTITIONER

## 2025-06-27 PROCEDURE — 63600175 PHARM REV CODE 636 W HCPCS: Performed by: PHYSICIAN ASSISTANT

## 2025-06-27 PROCEDURE — 87040 BLOOD CULTURE FOR BACTERIA: CPT | Performed by: PHYSICIAN ASSISTANT

## 2025-06-27 PROCEDURE — 99900031 HC PATIENT EDUCATION (STAT)

## 2025-06-27 PROCEDURE — 11000001 HC ACUTE MED/SURG PRIVATE ROOM

## 2025-06-27 PROCEDURE — 85025 COMPLETE CBC W/AUTO DIFF WBC: CPT | Performed by: PHYSICIAN ASSISTANT

## 2025-06-27 PROCEDURE — 94760 N-INVAS EAR/PLS OXIMETRY 1: CPT

## 2025-06-27 PROCEDURE — 83605 ASSAY OF LACTIC ACID: CPT | Performed by: PHYSICIAN ASSISTANT

## 2025-06-27 PROCEDURE — 25000003 PHARM REV CODE 250: Performed by: PHYSICIAN ASSISTANT

## 2025-06-27 PROCEDURE — 87070 CULTURE OTHR SPECIMN AEROBIC: CPT | Performed by: PHYSICIAN ASSISTANT

## 2025-06-27 PROCEDURE — 99285 EMERGENCY DEPT VISIT HI MDM: CPT | Mod: 25

## 2025-06-27 PROCEDURE — 94640 AIRWAY INHALATION TREATMENT: CPT

## 2025-06-27 PROCEDURE — 25000242 PHARM REV CODE 250 ALT 637 W/ HCPCS: Performed by: PHYSICIAN ASSISTANT

## 2025-06-27 PROCEDURE — 80053 COMPREHEN METABOLIC PANEL: CPT | Performed by: PHYSICIAN ASSISTANT

## 2025-06-27 PROCEDURE — 96365 THER/PROPH/DIAG IV INF INIT: CPT

## 2025-06-27 PROCEDURE — 99900035 HC TECH TIME PER 15 MIN (STAT)

## 2025-06-27 PROCEDURE — 63600175 PHARM REV CODE 636 W HCPCS: Performed by: INTERNAL MEDICINE

## 2025-06-27 PROCEDURE — 21400001 HC TELEMETRY ROOM

## 2025-06-27 RX ORDER — ENOXAPARIN SODIUM 100 MG/ML
40 INJECTION SUBCUTANEOUS EVERY 24 HOURS
Status: DISCONTINUED | OUTPATIENT
Start: 2025-06-27 | End: 2025-06-30 | Stop reason: HOSPADM

## 2025-06-27 RX ORDER — IPRATROPIUM BROMIDE AND ALBUTEROL SULFATE 2.5; .5 MG/3ML; MG/3ML
3 SOLUTION RESPIRATORY (INHALATION) EVERY 4 HOURS PRN
Status: DISCONTINUED | OUTPATIENT
Start: 2025-06-27 | End: 2025-06-30 | Stop reason: HOSPADM

## 2025-06-27 RX ORDER — ACETAMINOPHEN 325 MG/1
650 TABLET ORAL EVERY 8 HOURS PRN
Status: DISCONTINUED | OUTPATIENT
Start: 2025-06-27 | End: 2025-06-30 | Stop reason: HOSPADM

## 2025-06-27 RX ORDER — ONDANSETRON HYDROCHLORIDE 2 MG/ML
4 INJECTION, SOLUTION INTRAVENOUS EVERY 8 HOURS PRN
Status: DISCONTINUED | OUTPATIENT
Start: 2025-06-27 | End: 2025-06-30 | Stop reason: HOSPADM

## 2025-06-27 RX ORDER — LISINOPRIL AND HYDROCHLOROTHIAZIDE 10; 12.5 MG/1; MG/1
1 TABLET ORAL DAILY
COMMUNITY

## 2025-06-27 RX ORDER — GLUCAGON 1 MG
1 KIT INJECTION
Status: DISCONTINUED | OUTPATIENT
Start: 2025-06-27 | End: 2025-06-30 | Stop reason: HOSPADM

## 2025-06-27 RX ORDER — IBUPROFEN 200 MG
16 TABLET ORAL
Status: DISCONTINUED | OUTPATIENT
Start: 2025-06-27 | End: 2025-06-30 | Stop reason: HOSPADM

## 2025-06-27 RX ORDER — IPRATROPIUM BROMIDE AND ALBUTEROL SULFATE 2.5; .5 MG/3ML; MG/3ML
3 SOLUTION RESPIRATORY (INHALATION)
COMMUNITY
Start: 2025-04-14

## 2025-06-27 RX ORDER — TALC
6 POWDER (GRAM) TOPICAL NIGHTLY PRN
Status: DISCONTINUED | OUTPATIENT
Start: 2025-06-27 | End: 2025-06-30 | Stop reason: HOSPADM

## 2025-06-27 RX ORDER — IPRATROPIUM BROMIDE AND ALBUTEROL SULFATE 2.5; .5 MG/3ML; MG/3ML
3 SOLUTION RESPIRATORY (INHALATION)
Status: COMPLETED | OUTPATIENT
Start: 2025-06-27 | End: 2025-06-27

## 2025-06-27 RX ORDER — INSULIN ASPART 100 [IU]/ML
0-10 INJECTION, SOLUTION INTRAVENOUS; SUBCUTANEOUS
Status: DISCONTINUED | OUTPATIENT
Start: 2025-06-27 | End: 2025-06-30 | Stop reason: HOSPADM

## 2025-06-27 RX ORDER — ASPIRIN 81 MG/1
81 TABLET ORAL DAILY
COMMUNITY

## 2025-06-27 RX ORDER — SODIUM CHLORIDE 0.9 % (FLUSH) 0.9 %
10 SYRINGE (ML) INJECTION
Status: DISCONTINUED | OUTPATIENT
Start: 2025-06-27 | End: 2025-06-30 | Stop reason: HOSPADM

## 2025-06-27 RX ORDER — IBUPROFEN 200 MG
24 TABLET ORAL
Status: DISCONTINUED | OUTPATIENT
Start: 2025-06-27 | End: 2025-06-30 | Stop reason: HOSPADM

## 2025-06-27 RX ADMIN — VANCOMYCIN HYDROCHLORIDE 1000 MG: 1 INJECTION, POWDER, LYOPHILIZED, FOR SOLUTION INTRAVENOUS at 08:06

## 2025-06-27 RX ADMIN — ENOXAPARIN SODIUM 40 MG: 40 INJECTION SUBCUTANEOUS at 10:06

## 2025-06-27 RX ADMIN — DEXTROSE MONOHYDRATE 1250 MG: 50 INJECTION, SOLUTION INTRAVENOUS at 08:06

## 2025-06-27 RX ADMIN — IPRATROPIUM BROMIDE AND ALBUTEROL SULFATE 3 ML: .5; 3 SOLUTION RESPIRATORY (INHALATION) at 06:06

## 2025-06-27 RX ADMIN — PIPERACILLIN SODIUM AND TAZOBACTAM SODIUM 4.5 G: 4; .5 INJECTION, POWDER, LYOPHILIZED, FOR SOLUTION INTRAVENOUS at 07:06

## 2025-06-27 RX ADMIN — IPRATROPIUM BROMIDE AND ALBUTEROL SULFATE 3 ML: 2.5; .5 SOLUTION RESPIRATORY (INHALATION) at 10:06

## 2025-06-27 NOTE — FIRST PROVIDER EVALUATION
"Medical screening examination initiated.  I have conducted a focused provider triage encounter, findings are as follows:    Chief Complaint   Patient presents with    skin infection     Skin infection to left flank , on abx not getting better, states wound is draining and pain to site         Brief history of present illness:  69 y.o. male presents to the E.D. with c/o skin infection to left flank area. Patient reports he has been seen x 2 at [facility] with two different antibiotics with no improvement. Patient reports initially taking 600 of ibuprofen and now with 800 ibuprofen with some relief of pain intermittently. Patient with no fever.     Vitals:    06/27/25 1607   BP: (!) 152/82   BP Location: Left arm   Pulse: 100   Resp: 17   Temp: 98.1 °F (36.7 °C)   SpO2: (!) 94%   Weight: 99.8 kg (220 lb)   Height: 5' 9" (1.753 m)       Pertinent physical exam:  Awake, Alert, Oriented, Non labored breathing, swelling of left posterior flank     Brief workup plan:  labs    Preliminary workup initiated; this workup will be continued and followed by the physician or advanced practice provider that is assigned to the patient when roomed.  "

## 2025-06-27 NOTE — PROGRESS NOTES
"Pharmacokinetic Initial Assessment: IV Vancomycin    Assessment/Plan:    Initiate intravenous vancomycin with loading dose of 2250 mg once followed by a maintenance dose of vancomycin 1000 mg IV every 12 hours  Desired empiric serum trough concentration is 10 to 15 mcg/mL  Draw vancomycin trough level 60 min prior to fourth dose on 06/29/25 at approximately 0630  Pharmacy will continue to follow and monitor vancomycin.      Please contact pharmacy at extension [phone] with any questions regarding this assessment.     Thank you for the consult,   Laquita Zamudio       Patient brief summary:  Jemal Mcelroy Jr. is a 69 y.o. male initiated on antimicrobial therapy with IV Vancomycin for treatment of suspected skin & soft tissue infection    Drug Allergies:   Review of patient's allergies indicates:   Allergen Reactions    Metformin Diarrhea and Other (See Comments)     Other reaction(s): Other       Actual Body Weight:   99.8 kg    Renal Function:   Estimated Creatinine Clearance: 78.7 mL/min (based on SCr of 1.06 mg/dL).,     Dialysis Method (if applicable):  N/A    CBC (last 72 hours):  Recent Labs   Lab Result Units 06/27/25  1727   WBC x10(3)/mcL 9.00   Hgb g/dL 14.8   Hct % 45.0   Platelet x10(3)/mcL 236   Mono % % 8.8   Eos % % 6.0   Basophil % % 1.3       Metabolic Panel (last 72 hours):  Recent Labs   Lab Result Units 06/27/25  1727   Sodium mmol/L 136   Potassium mmol/L 3.8   Chloride mmol/L 104   CO2 mmol/L 23   Glucose mg/dL 121*   Blood Urea Nitrogen mg/dL 7.3*   Creatinine mg/dL 1.06   Albumin g/dL 3.6   Bilirubin Total mg/dL 0.2   ALP unit/L 53   AST unit/L 28   ALT unit/L 18       Drug levels (last 3 results):  No results for input(s): "VANCOMYCINRA", "VANCORANDOM", "VANCOMYCINPE", "VANCOPEAK", "VANCOMYCINTR", "VANCOTROUGH" in the last 72 hours.    Microbiologic Results:  Microbiology Results (last 7 days)       Procedure Component Value Units Date/Time    Wound Culture [[id number]] Collected: 06/27/25 1822    " Order Status: Sent Specimen: Drainage from Back, Lower Updated: 06/27/25 1830    Blood Culture #1 **CANNOT BE ORDERED STAT** [[id number]] Collected: 06/27/25 1727    Order Status: Resulted Specimen: Blood Updated: 06/27/25 1727    Blood Culture #2 **CANNOT BE ORDERED STAT** [[id number]] Collected: 06/27/25 1725    Order Status: Resulted Specimen: Blood Updated: 06/27/25 1725

## 2025-06-28 PROBLEM — F17.200 TOBACCO DEPENDENCY: Status: ACTIVE | Noted: 2025-06-28

## 2025-06-28 LAB
ALBUMIN SERPL-MCNC: 3.8 G/DL (ref 3.4–4.8)
ALBUMIN/GLOB SERPL: 0.9 RATIO (ref 1.1–2)
ALP SERPL-CCNC: 54 UNIT/L (ref 40–150)
ALT SERPL-CCNC: 19 UNIT/L (ref 0–55)
ANION GAP SERPL CALC-SCNC: 8 MEQ/L
AST SERPL-CCNC: 26 UNIT/L (ref 11–45)
BASOPHILS # BLD AUTO: 0.13 X10(3)/MCL
BASOPHILS NFR BLD AUTO: 1.3 %
BILIRUB SERPL-MCNC: 0.5 MG/DL
BUN SERPL-MCNC: 7.1 MG/DL (ref 8.4–25.7)
CALCIUM SERPL-MCNC: 9.2 MG/DL (ref 8.8–10)
CHLORIDE SERPL-SCNC: 104 MMOL/L (ref 98–107)
CO2 SERPL-SCNC: 27 MMOL/L (ref 23–31)
CREAT SERPL-MCNC: 1.01 MG/DL (ref 0.72–1.25)
CREAT/UREA NIT SERPL: 7
EOSINOPHIL # BLD AUTO: 0.58 X10(3)/MCL (ref 0–0.9)
EOSINOPHIL NFR BLD AUTO: 5.6 %
ERYTHROCYTE [DISTWIDTH] IN BLOOD BY AUTOMATED COUNT: 13.4 % (ref 11.5–17)
GFR SERPLBLD CREATININE-BSD FMLA CKD-EPI: >60 ML/MIN/1.73/M2
GLOBULIN SER-MCNC: 4.2 GM/DL (ref 2.4–3.5)
GLUCOSE SERPL-MCNC: 102 MG/DL (ref 82–115)
HCT VFR BLD AUTO: 46.9 % (ref 42–52)
HGB BLD-MCNC: 15.1 G/DL (ref 14–18)
IMM GRANULOCYTES # BLD AUTO: 0.07 X10(3)/MCL (ref 0–0.04)
IMM GRANULOCYTES NFR BLD AUTO: 0.7 %
LYMPHOCYTES # BLD AUTO: 2.64 X10(3)/MCL (ref 0.6–4.6)
LYMPHOCYTES NFR BLD AUTO: 25.5 %
MCH RBC QN AUTO: 34 PG (ref 27–31)
MCHC RBC AUTO-ENTMCNC: 32.2 G/DL (ref 33–36)
MCV RBC AUTO: 105.6 FL (ref 80–94)
MONOCYTES # BLD AUTO: 0.92 X10(3)/MCL (ref 0.1–1.3)
MONOCYTES NFR BLD AUTO: 8.9 %
NEUTROPHILS # BLD AUTO: 6.01 X10(3)/MCL (ref 2.1–9.2)
NEUTROPHILS NFR BLD AUTO: 58 %
NRBC BLD AUTO-RTO: 0 %
PLATELET # BLD AUTO: 269 X10(3)/MCL (ref 130–400)
PMV BLD AUTO: 9.9 FL (ref 7.4–10.4)
POCT GLUCOSE: 99 MG/DL (ref 70–110)
POTASSIUM SERPL-SCNC: 4 MMOL/L (ref 3.5–5.1)
PROT SERPL-MCNC: 8 GM/DL (ref 5.8–7.6)
RBC # BLD AUTO: 4.44 X10(6)/MCL (ref 4.7–6.1)
SODIUM SERPL-SCNC: 139 MMOL/L (ref 136–145)
WBC # BLD AUTO: 10.35 X10(3)/MCL (ref 4.5–11.5)

## 2025-06-28 PROCEDURE — 94761 N-INVAS EAR/PLS OXIMETRY MLT: CPT

## 2025-06-28 PROCEDURE — 25000003 PHARM REV CODE 250: Performed by: PHYSICIAN ASSISTANT

## 2025-06-28 PROCEDURE — 25000003 PHARM REV CODE 250: Performed by: INTERNAL MEDICINE

## 2025-06-28 PROCEDURE — 94760 N-INVAS EAR/PLS OXIMETRY 1: CPT

## 2025-06-28 PROCEDURE — 80053 COMPREHEN METABOLIC PANEL: CPT | Performed by: INTERNAL MEDICINE

## 2025-06-28 PROCEDURE — 99900031 HC PATIENT EDUCATION (STAT)

## 2025-06-28 PROCEDURE — 87070 CULTURE OTHR SPECIMN AEROBIC: CPT

## 2025-06-28 PROCEDURE — 25000003 PHARM REV CODE 250

## 2025-06-28 PROCEDURE — 0J970ZZ DRAINAGE OF BACK SUBCUTANEOUS TISSUE AND FASCIA, OPEN APPROACH: ICD-10-PCS | Performed by: STUDENT IN AN ORGANIZED HEALTH CARE EDUCATION/TRAINING PROGRAM

## 2025-06-28 PROCEDURE — 94640 AIRWAY INHALATION TREATMENT: CPT

## 2025-06-28 PROCEDURE — 27000221 HC OXYGEN, UP TO 24 HOURS

## 2025-06-28 PROCEDURE — 99900035 HC TECH TIME PER 15 MIN (STAT)

## 2025-06-28 PROCEDURE — 21400001 HC TELEMETRY ROOM

## 2025-06-28 PROCEDURE — 36415 COLL VENOUS BLD VENIPUNCTURE: CPT | Performed by: INTERNAL MEDICINE

## 2025-06-28 PROCEDURE — 10060 I&D ABSCESS SIMPLE/SINGLE: CPT

## 2025-06-28 PROCEDURE — 85025 COMPLETE CBC W/AUTO DIFF WBC: CPT | Performed by: INTERNAL MEDICINE

## 2025-06-28 PROCEDURE — 63600175 PHARM REV CODE 636 W HCPCS: Performed by: INTERNAL MEDICINE

## 2025-06-28 PROCEDURE — 99223 1ST HOSP IP/OBS HIGH 75: CPT | Mod: ,,, | Performed by: STUDENT IN AN ORGANIZED HEALTH CARE EDUCATION/TRAINING PROGRAM

## 2025-06-28 PROCEDURE — 25000242 PHARM REV CODE 250 ALT 637 W/ HCPCS: Performed by: NURSE PRACTITIONER

## 2025-06-28 PROCEDURE — 63600175 PHARM REV CODE 636 W HCPCS: Performed by: PHYSICIAN ASSISTANT

## 2025-06-28 PROCEDURE — 63600175 PHARM REV CODE 636 W HCPCS

## 2025-06-28 RX ORDER — LIDOCAINE HYDROCHLORIDE AND EPINEPHRINE 10; 10 UG/ML; MG/ML
20 INJECTION, SOLUTION INFILTRATION; PERINEURAL ONCE
Status: COMPLETED | OUTPATIENT
Start: 2025-06-28 | End: 2025-06-28

## 2025-06-28 RX ORDER — ATORVASTATIN CALCIUM 40 MG/1
40 TABLET, FILM COATED ORAL DAILY
Status: DISCONTINUED | OUTPATIENT
Start: 2025-06-28 | End: 2025-06-30 | Stop reason: HOSPADM

## 2025-06-28 RX ORDER — ASPIRIN 81 MG/1
81 TABLET ORAL DAILY
Status: DISCONTINUED | OUTPATIENT
Start: 2025-06-28 | End: 2025-06-30 | Stop reason: HOSPADM

## 2025-06-28 RX ORDER — HYDROCHLOROTHIAZIDE 25 MG/1
25 TABLET ORAL DAILY
Status: DISCONTINUED | OUTPATIENT
Start: 2025-06-28 | End: 2025-06-30 | Stop reason: HOSPADM

## 2025-06-28 RX ORDER — OXYCODONE HYDROCHLORIDE 5 MG/1
5 TABLET ORAL EVERY 6 HOURS PRN
Refills: 0 | Status: DISCONTINUED | OUTPATIENT
Start: 2025-06-28 | End: 2025-06-30 | Stop reason: HOSPADM

## 2025-06-28 RX ORDER — ENALAPRIL MALEATE AND HYDROCHLOROTHIAZIDE 10; 25 MG/1; MG/1
1 TABLET ORAL DAILY
Status: DISCONTINUED | OUTPATIENT
Start: 2025-06-28 | End: 2025-06-28

## 2025-06-28 RX ORDER — LISINOPRIL 20 MG/1
20 TABLET ORAL DAILY
Status: DISCONTINUED | OUTPATIENT
Start: 2025-06-28 | End: 2025-06-30 | Stop reason: HOSPADM

## 2025-06-28 RX ORDER — IBUPROFEN 200 MG
1 TABLET ORAL DAILY PRN
Status: DISCONTINUED | OUTPATIENT
Start: 2025-06-28 | End: 2025-06-30 | Stop reason: HOSPADM

## 2025-06-28 RX ORDER — METOPROLOL SUCCINATE 50 MG/1
50 TABLET, EXTENDED RELEASE ORAL DAILY
Status: DISCONTINUED | OUTPATIENT
Start: 2025-06-28 | End: 2025-06-30 | Stop reason: HOSPADM

## 2025-06-28 RX ADMIN — ACETAMINOPHEN 650 MG: 325 TABLET ORAL at 04:06

## 2025-06-28 RX ADMIN — Medication 6 MG: at 09:06

## 2025-06-28 RX ADMIN — LIDOCAINE HYDROCHLORIDE AND EPINEPHRINE 20 ML: 10; 10 INJECTION, SOLUTION INFILTRATION; PERINEURAL at 01:06

## 2025-06-28 RX ADMIN — LISINOPRIL 20 MG: 20 TABLET ORAL at 09:06

## 2025-06-28 RX ADMIN — ACETAMINOPHEN 650 MG: 325 TABLET ORAL at 09:06

## 2025-06-28 RX ADMIN — IPRATROPIUM BROMIDE AND ALBUTEROL SULFATE 3 ML: 2.5; .5 SOLUTION RESPIRATORY (INHALATION) at 09:06

## 2025-06-28 RX ADMIN — VANCOMYCIN HYDROCHLORIDE 1000 MG: 1 INJECTION, POWDER, LYOPHILIZED, FOR SOLUTION INTRAVENOUS at 09:06

## 2025-06-28 RX ADMIN — IPRATROPIUM BROMIDE AND ALBUTEROL SULFATE 3 ML: 2.5; .5 SOLUTION RESPIRATORY (INHALATION) at 04:06

## 2025-06-28 RX ADMIN — IPRATROPIUM BROMIDE AND ALBUTEROL SULFATE 3 ML: 2.5; .5 SOLUTION RESPIRATORY (INHALATION) at 11:06

## 2025-06-28 RX ADMIN — IPRATROPIUM BROMIDE AND ALBUTEROL SULFATE 3 ML: 2.5; .5 SOLUTION RESPIRATORY (INHALATION) at 07:06

## 2025-06-28 RX ADMIN — HYDROCHLOROTHIAZIDE 25 MG: 25 TABLET ORAL at 09:06

## 2025-06-28 RX ADMIN — IPRATROPIUM BROMIDE AND ALBUTEROL SULFATE 3 ML: 2.5; .5 SOLUTION RESPIRATORY (INHALATION) at 12:06

## 2025-06-28 RX ADMIN — PIPERACILLIN SODIUM AND TAZOBACTAM SODIUM 4.5 G: 4; .5 INJECTION, POWDER, LYOPHILIZED, FOR SOLUTION INTRAVENOUS at 09:06

## 2025-06-28 RX ADMIN — PIPERACILLIN SODIUM AND TAZOBACTAM SODIUM 4.5 G: 4; .5 INJECTION, POWDER, LYOPHILIZED, FOR SOLUTION INTRAVENOUS at 03:06

## 2025-06-28 RX ADMIN — ASPIRIN 81 MG: 81 TABLET, DELAYED RELEASE ORAL at 09:06

## 2025-06-28 RX ADMIN — ENOXAPARIN SODIUM 40 MG: 40 INJECTION SUBCUTANEOUS at 04:06

## 2025-06-28 RX ADMIN — PIPERACILLIN SODIUM AND TAZOBACTAM SODIUM 4.5 G: 4; .5 INJECTION, POWDER, LYOPHILIZED, FOR SOLUTION INTRAVENOUS at 05:06

## 2025-06-28 RX ADMIN — METOPROLOL SUCCINATE 50 MG: 50 TABLET, EXTENDED RELEASE ORAL at 09:06

## 2025-06-28 RX ADMIN — ATORVASTATIN CALCIUM 40 MG: 40 TABLET, FILM COATED ORAL at 09:06

## 2025-06-28 RX ADMIN — OXYCODONE HYDROCHLORIDE 5 MG: 5 TABLET ORAL at 01:06

## 2025-06-28 NOTE — CONSULTS
[facility] - 8th Floor Med Surg  Wound Care    Patient Name:  Jemal Mcelroy Jr.   MRN:  [id number]  Date: 6/28/2025  Diagnosis: Cellulitis of back    History:     Past Medical History:   Diagnosis Date    Cancer     COPD (chronic obstructive pulmonary disease)     Hypertension     Personal history of colonic polyps        Social History[1]    Precautions:     Allergies as of 06/27/2025 - Reviewed 06/27/2025   Allergen Reaction Noted    Metformin Diarrhea and Other (See Comments) 03/31/2023       WO Assessment Details/Treatment        06/28/25 1043   WOCN Assessment   Visit Date 06/28/25   Visit Time 1043   Consult Type New   WO Speciality Wound   Wound other  (abscess)   Intervention assessed;changed;applied;chart review;coordination of care;orders   Teaching on-going        Wound 06/20/25 1857 Abscess Left Back #1   Date First Assessed/Time First Assessed: 06/20/25 1857   Present on Original Admission: Yes  Primary Wound Type: Abscess  Side: Left  Location: Back  Wound Number: #1   Wound Image    Dressing Appearance Moist drainage   Drainage Amount Moderate   Drainage Characteristics/Odor Purulent;Green   Appearance Red;Yellow;Moist   Tissue loss description Full thickness   Black (%), Wound Tissue Color 0 %   Red (%), Wound Tissue Color 20 %   Yellow (%), Wound Tissue Color 80 %   Periwound Area Redness;Swelling;Warm   Wound Edges Defined;Irregular   Wound Length (cm) 1 cm   Wound Width (cm) 1.3 cm   Wound Depth (cm) 1 cm   Wound Volume (cm^3) 0.681 cm^3   Wound Surface Area (cm^2) 1.02 cm^2   Care Cleansed with:;Antimicrobial agent;Wound cleanser;Other (see comments)  (vashe)   Dressing Changed;Silver;Absorptive Pad     WOCN consulted for abscess to L back. Patient awake, sitting edge of bed, family at bedside. Educated patient on type of wound care done and how it promotes healing. Patient verbalized understanding. Treatment recommendations: cleanse with vashe, pat dry, apply aquacel ag, cover with  abd pad, change daily and PRN soilage. Nursing to continue with current treatment recommendations. Wound care will follow.     06/28/2025         [1]   Social History  Socioeconomic History    Marital status:    Tobacco Use    Smoking status: Every Day     Current packs/day: 1.00     Types: Cigarettes    Smokeless tobacco: Never   Substance and Sexual Activity    Drug use: Yes     Frequency: 2.0 times per week     Types: Marijuana     Social Drivers of Health     Financial Resource Strain: Low Risk  (6/28/2025)    Overall Financial Resource Strain (CARDIA)     Difficulty of Paying Living Expenses: Not hard at all   Food Insecurity: No Food Insecurity (6/28/2025)    Hunger Vital Sign     Worried About Running Out of Food in the Last Year: Never true     Ran Out of Food in the Last Year: Never true   Transportation Needs: No Transportation Needs (6/28/2025)    PRAPARE - Transportation     Lack of Transportation (Medical): No     Lack of Transportation (Non-Medical): No   Stress: No Stress Concern Present (6/28/2025)    [employer] [place] [employer] - Occupational Stress Questionnaire     Feeling of Stress : Not at all   Housing Stability: Low Risk  (6/28/2025)    Housing Stability Vital Sign     Unable to Pay for Housing in the Last Year: No     Homeless in the Last Year: No

## 2025-06-28 NOTE — PROCEDURES
"Jemal Mcelroy Jr. is a 69 y.o. male patient.    Temp: 98.3 °F (36.8 °C) (25 1224)  Pulse: 77 (25 1224)  Resp: 16 (25 1208)  BP: 120/73 (25 1224)  SpO2: (!) 94 % (25 1224)  Weight: 99.8 kg (220 lb 0.3 oz) (25 0227)  Height: 5' 9" (175.3 cm) (25 1607)       Incision and Drainage    Date/Time: 2025 1:18 PM  Location procedure was performed: [facility]    Performed by: Bret Henao MD  Authorized by: Joao Chery MD  Consent Done: Yes  Consent: Verbal consent obtained. Written consent obtained  Risks and benefits: risks, benefits and alternatives were discussed  Consent given by: spouse  Patient understanding: patient states understanding of the procedure being performed  Patient consent: the patient's understanding of the procedure matches consent given  Procedure consent: procedure consent matches procedure scheduled  Imaging studies: imaging studies available  Patient identity confirmed: , MRN and name  Type: abscess  Body area: trunk  Location details: back    Anesthesia:  Local Anesthetic: lidocaine 1% with epinephrine  Anesthetic total: 10 mL    Patient sedated: no  Description of findings: Approximate 1 x 1 x 1 cm abscess cavity   Scalpel size: 10  Incision type: single straight  Incision depth: subcutaneous  Complexity: simple  Drainage: bloody and purulent  Drainage amount: scant  Wound treatment: incision, drainage, deloculation, sebum removal, expression of material and wound packed  Packing material: / in gauze  Complications: No  Specimens: No  Implants: No  Patient tolerance: Patient tolerated the procedure well with no immediate complications  Comments: Area was prepped and draped in the usual sterile fashion.  Approximately 10 cc of lidocaine with 1% epinephrine was injected into the skin.  A straight vertical incision was made along the midline of the abscess cavity and the incision was extended bluntly downward towards the abscess " cavity.  At this time, wound cultures were obtained.  Using hemostats and digit, the abscess pocket was de loculated and purulence was expressed.  The wound was then irrigated with saline flushes and packed with iodoform.  Finally, the incision was dressed with 4x4s and ABD pad.  Patient tolerated procedure well.    Incision depth: subcutaneous          6/28/2025

## 2025-06-28 NOTE — H&P
[facility] Hospital Medicine History & Physical Examination       Patient Name: Jemal Mcelroy Jr.  MRN: [id number]  Patient Class: IP- Inpatient   Admission Date: 6/27/2025  4:09 PM  Length of Stay: 0  Admitting Service: Hospital Medicine   Attending Physician: Kar Mejia MD   Primary Care Provider: No, Primary Doctor  History source: EMR, patient and/or patient's family  Screening for Social Drivers for health: Patient screened for food insecurity, housing instability, transportation needs, utility   difficulties, and interpersonal safety (select all that apply as identified as concern)  []Housing or Food  []Transportation Needs  []Utility Difficulties  []Interpersonal safety  [x]None    CHIEF COMPLAINT   Left flank skin problem    HISTORY OF PRESENT ILLNESS:   Patient is a 69-year-old male with a past medical history of COPD, HTN, DM2, and additional past medical history as below who presents to the ER complaining of a persistent opening on his left flank with pain.  Patient reports developing a local abscess in this region that is spontaneously opened and started to drain about a week and a half ago.  He received courses of antibiotics to the ER with no significant improvement which prompted his visit tonight.    Patient arrived to the ER afebrile, hemodynamically stable satting in the mid 90s on room air.  Laboratory work was overall unremarkable.  An ultrasound of the area showed left flank/back abscess with a fistula trach open to the surface.  Blood and wound cultures were obtained, and broad-spectrum antibiotics initiated.    PAST MEDICAL HISTORY:     Past Medical History:   Diagnosis Date    Cancer     COPD (chronic obstructive pulmonary disease)     Hypertension     Personal history of colonic polyps        PAST SURGICAL HISTORY:     Past Surgical History:   Procedure Laterality Date    COLONOSCOPY  03/17/2017       ALLERGIES:   Metformin    FAMILY HISTORY:   Reviewed  and non-contributory     SOCIAL HISTORY:     Social History     Tobacco Use    Smoking status: Every Day     Current packs/day: 1.00     Types: Cigarettes    Smokeless tobacco: Never   Substance Use Topics    Alcohol use: Not on file        HOME MEDICATIONS:     Prior to Admission medications    Medication Sig Start Date End Date Taking? Authorizing Provider   albuterol-ipratropium (DUO-NEB) 2.5 mg-0.5 mg/3 mL nebulizer solution Take 3 mLs by nebulization every 6 (six) hours while awake. 4/14/25  Yes Provider, Historical   albuterol (PROVENTIL/VENTOLIN HFA) 90 mcg/actuation inhaler Inhale 1-2 puffs into the lungs every 6 (six) hours as needed for Wheezing. Rescue 2/6/23 3/8/23  Pancho Maier ACNP   atorvastatin (LIPITOR) 40 MG tablet Take 40 mg by mouth once daily.    Provider, Historical   azithromycin (Z-STEPHANIE) 250 MG tablet Take 1 tablet (250 mg total) by mouth once daily. Take first 2 tablets together, then 1 every day until finished. 6/19/23   Marley Daniel PA-C   enalapriL-hydrochlorothiazide (VASERETIC) 10-25 mg per tablet TAKE 1 TABLET EVERY DAY 5/30/22   Blanca Tavares NP   gabapentin (NEURONTIN) 300 MG capsule Take 300 mg by mouth 3 (three) times daily.    Provider, Historical   glimepiride (AMARYL) 1 MG tablet TAKE 1 TABLET EVERY DAY 5/30/22   Blanca Tavares NP   ibuprofen (ADVIL,MOTRIN) 600 MG tablet Take 1 tablet (600 mg total) by mouth every 6 (six) hours as needed for Pain. 6/16/25   Javid Baumann MD   ibuprofen (ADVIL,MOTRIN) 800 MG tablet Take 1 tablet (800 mg total) by mouth every 6 (six) hours as needed for Pain. 6/20/25   Magda Briceno PA   metoprolol succinate (TOPROL-XL) 50 MG 24 hr tablet Take 1 tablet (50 mg total) by mouth once daily. 5/15/23 6/14/23  Tung Pruitt III, MD   pantoprazole (PROTONIX) 40 MG tablet Take 1 tablet (40 mg total) by mouth once daily. 3/6/25 3/6/26  Avery Sandoval MD   polyethylene glycol (MOVIPREP) 100-7.5-2.691 gram solution Take  "as directed prior to colonoscopy 4/10/25   Radha Rowell, FNP   sulfamethoxazole-trimethoprim 800-160mg (BACTRIM DS) 800-160 mg Tab Take 1 tablet by mouth 2 (two) times daily. for 7 days 6/20/25 6/27/25  Magda Briceno PA       REVIEW OF SYSTEMS:   Except as documented, all other systems reviewed and negative     PHYSICAL EXAM:   T 98.1 °F (36.7 °C)   BP (!) 154/116   P 90   RR 18   O2 (!) 93 %  GENERAL: awake, alert, oriented and in no acute distress, non-toxic appearing   HEENT: normocephalic atraumatic   NECK: supple   LUNGS: Clear bilaterally, no wheezing or rales, no accessory muscle use   CVS: Regular rate and rhythm, normal peripheral perfusion  ABD: Soft, non-tender, non-distended, bowel sounds present  EXTREMITIES: no clubbing or cyanosis  SKIN: Warm, dry.   NEURO: alert and oriented, grossly without focal deficits   PSYCHIATRIC: Cooperative    LABS AND IMAGING:     Recent Labs     06/27/25  1727   WBC 9.00   RBC 4.26*   HGB 14.8   HCT 45.0   .6*   MCH 34.7*   MCHC 32.9*   RDW 13.6        No results for input(s): "LACTIC" in the last 72 hours.  No results for input(s): "INR", "APTT", "D-DIMER" in the last 72 hours.  No results for input(s): "HGBA1C", "CHOL", "TRIG", "LDL", "VLDL", "HDL" in the last 72 hours.   Recent Labs     06/27/25  1727      K 3.8   CO2 23   BUN 7.3*   CREATININE 1.06   CALCIUM 9.0   ALBUMIN 3.6   GLOBULIN 4.2*   ALKPHOS 53   ALT 18   AST 28   BILITOT 0.2     No results for input(s): "BNP", "CPK", "TROPONINI" in the last 72 hours.       US Soft Tissue Chest_Upper Back  Narrative: EXAMINATION:  US SOFT TISSUE CHEST_UPPER BACK    CLINICAL HISTORY:  evaluation of possible abscess of left back;    TECHNIQUE:  Multiple Limited real-time images are performed of the left flank/back.    COMPARISON:  None available    FINDINGS:  There is small complex fluid collection consistent with an abscess with visible fistula tract which is open to the surface of the skin.  " This measures 1.4 x 1.6 x 2.4 cm and is 1.5 cm deep from the skin surface.  Impression: Left flank/back abscess with fistula tract open to the skin surface.    Electronically signed by: Lyle Ziegler  Date:    06/27/2025  Time:    19:26      ASSESSMENT & PLAN:   Left flank draining abscess and surrounding cellulitis  Failure of outpatient management of above    Hx: COPD, HTN, DM2    -blood and wound cultures   -continue broad-spectrum empiric antibiotics   -wound care nursing nursing consult  -resume home meds as appropriate pending med rec process    DVT prophylaxis:  Lovenox  Code status:  Full code    If patient was admitted under observational status it is with my approval/permission.     At least 55 min was spent on this history and physical.  Time seen: 10 pm 6/28  Kar Mejia MD

## 2025-06-28 NOTE — ED PROVIDER NOTES
Encounter Date: 6/27/2025       History     Chief Complaint   Patient presents with    skin infection     Skin infection to left flank , on abx not getting better, states wound is draining and pain to site     69-year-old male with history of lung cancer, COPD, hypertension presents to ED for evaluation cellulitis to his left back.  Patient reports it started with a boil.  States he tried to open it up however was unsuccessful.  Reports he was started on Augmentin and then placed on Bactrim without improvement.  Patient has open wound noted with purulent drainage.  Subjective fevers at home.    The history is provided by the patient. No  was used.     Review of patient's allergies indicates:   Allergen Reactions    Metformin Diarrhea and Other (See Comments)     Other reaction(s): Other     Past Medical History:   Diagnosis Date    Cancer     COPD (chronic obstructive pulmonary disease)     Hypertension     Personal history of colonic polyps      Past Surgical History:   Procedure Laterality Date    COLONOSCOPY  03/17/2017     No family history on file.  Social History[1]  Review of Systems   Constitutional:  Positive for chills, fatigue and fever.   HENT:  Negative for sore throat.    Respiratory:  Negative for shortness of breath.    Cardiovascular:  Negative for chest pain.   Gastrointestinal:  Negative for nausea and vomiting.   Genitourinary:  Negative for dysuria.   Musculoskeletal:  Negative for back pain.   Skin:  Positive for wound. Negative for rash.   Neurological:  Negative for weakness.   Hematological:  Does not bruise/bleed easily.       Physical Exam     Initial Vitals [06/27/25 1607]   BP Pulse Resp Temp SpO2   (!) 152/82 100 17 98.1 °F (36.7 °C) (!) 94 %      MAP       --         Physical Exam    Nursing note and vitals reviewed.  Constitutional: He appears well-developed and well-nourished. He is cooperative.   HENT:   Head: Normocephalic and atraumatic.   Right Ear: Tympanic  membrane and external ear normal.   Left Ear: Tympanic membrane and external ear normal. Mouth/Throat: Uvula is midline, oropharynx is clear and moist and mucous membranes are normal. No trismus in the jaw. No uvula swelling.   Eyes: Conjunctivae are normal. Pupils are equal, round, and reactive to light.   Neck: Neck supple.   Normal range of motion.  Cardiovascular:  Normal rate, regular rhythm and normal heart sounds.           Pulmonary/Chest: Breath sounds normal. No respiratory distress. He has no wheezes. He has no rhonchi. He has no rales.   Abdominal: Abdomen is soft. Bowel sounds are normal. There is no abdominal tenderness.   Musculoskeletal:         General: Normal range of motion.      Cervical back: Normal range of motion and neck supple.     Neurological: He is alert and oriented to person, place, and time.   Skin: Skin is warm and dry. Capillary refill takes less than 2 seconds.   Open draining wound noted to left back.  Surrounding erythema with induration   Psychiatric: He has a normal mood and affect.               ED Course   Procedures  Labs Reviewed   COMPREHENSIVE METABOLIC PANEL - Abnormal       Result Value    Sodium 136      Potassium 3.8      Chloride 104      CO2 23      Glucose 121 (*)     Blood Urea Nitrogen 7.3 (*)     Creatinine 1.06      Calcium 9.0      Protein Total 7.8 (*)     Albumin 3.6      Globulin 4.2 (*)     Albumin/Globulin Ratio 0.9 (*)     Bilirubin Total 0.2      ALP 53      ALT 18      AST 28      eGFR >60      Anion Gap 9.0      BUN/Creatinine Ratio 7     CBC WITH DIFFERENTIAL - Abnormal    WBC 9.00      RBC 4.26 (*)     Hgb 14.8      Hct 45.0      .6 (*)     MCH 34.7 (*)     MCHC 32.9 (*)     RDW 13.6      Platelet 236      MPV 9.4      Neut % 62.1      Lymph % 21.0      Mono % 8.8      Eos % 6.0      Basophil % 1.3      Imm Grans % 0.8      Neut # 5.59      Lymph # 1.89      Mono # 0.79      Eos # 0.54      Baso # 0.12      Imm Gran # 0.07 (*)     NRBC% 0.0      LACTIC ACID, PLASMA - Normal    Lactic Acid Level 1.6     BLOOD CULTURE [facility]   BLOOD CULTURE [facility]   WOUND CULTURE ([facility])   CBC W/ AUTO DIFFERENTIAL    Narrative:     The following orders were created for panel order CBC auto differential.  Procedure                               Abnormality         Status                     ---------                               -----------         ------                     CBC with Differential[[id number]]       Abnormal            Final result                 Please view results for these tests on the individual orders.   POCT GLUCOSE MONITORING CONTINUOUS          Imaging Results              US Soft Tissue Chest_Upper Back (Final result)  Result time 06/27/25 19:26:10      Final result by Lyle Ziegler MD (06/27/25 19:26:10)                   Impression:      Left flank/back abscess with fistula tract open to the skin surface.      Electronically signed by: Lyle Ziegler  Date:    06/27/2025  Time:    19:26               Narrative:    EXAMINATION:  US SOFT TISSUE CHEST_UPPER BACK    CLINICAL HISTORY:  evaluation of possible abscess of left back;    TECHNIQUE:  Multiple Limited real-time images are performed of the left flank/back.    COMPARISON:  None available    FINDINGS:  There is small complex fluid collection consistent with an abscess with visible fistula tract which is open to the surface of the skin.  This measures 1.4 x 1.6 x 2.4 cm and is 1.5 cm deep from the skin surface.                                       Medications   vancomycin - pharmacy to dose (has no administration in time range)   vancomycin (VANCOCIN) 1,000 mg in D5W 250 mL IVPB (admixture device) (has no administration in time range)   sodium chloride 0.9% flush 10 mL (has no administration in time range)   melatonin tablet 6 mg (has no administration in time range)   enoxaparin injection 40 mg (40 mg Subcutaneous Given 6/27/25 2212)   acetaminophen tablet 650 mg (has no administration in time range)    acetaminophen tablet 650 mg (has no administration in time range)   ondansetron injection 4 mg (has no administration in time range)   glucose chewable tablet 16 g (has no administration in time range)   glucose chewable tablet 24 g (has no administration in time range)   dextrose 50% injection 12.5 g (has no administration in time range)   dextrose 50% injection 25 g (has no administration in time range)   glucagon (human recombinant) injection 1 mg (has no administration in time range)   insulin aspart U-100 injection 0-10 Units (1 Units Subcutaneous Not Given 6/27/25 2237)   piperacillin-tazobactam (ZOSYN) 4.5 g in D5W 100 mL IVPB (MB+) (has no administration in time range)   albuterol-ipratropium 2.5 mg-0.5 mg/3 mL nebulizer solution 3 mL (3 mLs Nebulization Given 6/27/25 2226)   piperacillin-tazobactam (ZOSYN) 4.5 g in D5W 100 mL IVPB (MB+) (0 g Intravenous Stopped 6/27/25 1945)   albuterol-ipratropium 2.5 mg-0.5 mg/3 mL nebulizer solution 3 mL (3 mLs Nebulization Given 6/27/25 1844)   vancomycin 1,250 mg in D5W 250 mL IVPB (admixture device) (0 mg Intravenous Stopped 6/27/25 2134)     Followed by   vancomycin (VANCOCIN) 1,000 mg in D5W 250 mL IVPB (admixture device) (0 mg Intravenous Stopped 6/27/25 2134)     Medical Decision Making  69-year-old male with history of lung cancer, COPD, hypertension presents to ED for evaluation cellulitis to his left back.  Patient reports it started with a boil.  States he tried to open it up however was unsuccessful.  Reports he was started on Augmentin and then placed on Bactrim without improvement.  Patient has open wound noted with purulent drainage.  Subjective fevers at [facility]    Differential diagnosis includes but isn't limited to cellulitis, abscess, sepsis, wound infection    Amount and/or Complexity of Data Reviewed  Labs: ordered.  Radiology: ordered.  Discussion of management or test interpretation with external provider(s): Patient with open draining wound noted  that has not improved after multiple rounds of antibiotics.  Patient afebrile here.  Labs unremarkable.  Patient has failed outpatient antibiotic treatment however no area of fluctuance noted for I&D.  Ultrasound obtained without a collection of fluid.  Started on vanc and Zosyn.  Blood cultures and wound culture obtained.  Discussed case with ED attending Dr. Nazario, he had face-to-face encounter with the patient.  Discussed case with hospital medicine will admit for further evaluation and treatment.    Risk  OTC drugs.  Prescription drug management.  Decision regarding hospitalization.                                      Clinical Impression:  Final diagnoses:  [L03.312] Cellulitis of back          ED Disposition Condition    Admit                       [1]   Social History  Tobacco Use    Smoking status: Every Day     Current packs/day: 1.00     Types: Cigarettes    Smokeless tobacco: Never   Substance Use Topics    Drug use: Yes     Frequency: 2.0 times per week     Types: Marijuana        Nell Eddy PA  06/27/25 1684

## 2025-06-28 NOTE — PROGRESS NOTES
[facility] Hospital Medicine Progress Note        Chief Complaint: Inpatient Follow-up for left flank abscess     HPI:   Patient is a 69-year-old male with a past medical history of COPD, HTN, DM2, and additional past medical history as below who presents to the ER complaining of a persistent opening on his left flank with pain.  Patient reports developing a local abscess in this region that is spontaneously opened and started to drain about a week and a half ago.  He received courses of antibiotics to the ER with no significant improvement which prompted his visit tonight.     Patient arrived to the ER afebrile, hemodynamically stable satting in the mid 90s on room air.  Laboratory work was overall unremarkable.  An ultrasound of the area showed left flank/back abscess with a fistula trach open to the surface.  Blood and wound cultures were obtained, and broad-spectrum antibiotics initiated.    He has a significant area of redness and induration. Surgery team consulted for I&D. US showed fluid collection.     Interval Hx:   Patient today awake and comfortable. Denies any fever or chills. Pain controlled.     Case was discussed with patient's nurse and  on the floor.    Objective/physical exam:  General: In no acute distress  Chest: Clear to auscultation bilaterally  Heart: RRR, +S1, S2, no appreciable murmur  Abdomen: Soft, nontender, BS +  MSK: left flank + redness, induration and open wound with purulent draininge  Neurologic: Alert and oriented x4, Cranial nerve II-XII intact, Strength 5/5 in all 4 extremities    VITAL SIGNS: 24 HRS MIN & MAX LAST   Temp  Min: 97.7 °F (36.5 °C)  Max: 98.6 °F (37 °C) 97.9 °F (36.6 °C)   BP  Min: 111/65  Max: 154/116 118/77   Pulse  Min: 75  Max: 100  80   Resp  Min: 17  Max: 24 (!) 22   SpO2  Min: 90 %  Max: 98 % 96 %     I have reviewed the following labs:  Recent Labs   Lab 06/27/25  1727 06/28/25  0436   WBC 9.00 10.35   RBC 4.26*  4.44*   HGB 14.8 15.1   HCT 45.0 46.9   .6* 105.6*   MCH 34.7* 34.0*   MCHC 32.9* 32.2*   RDW 13.6 13.4    269   MPV 9.4 9.9     Recent Labs   Lab 06/27/25 1727 06/28/25  0436    139   K 3.8 4.0    104   CO2 23 27   BUN 7.3* 7.1*   CREATININE 1.06 1.01   * 102   CALCIUM 9.0 9.2   ALBUMIN 3.6 3.8   PROT 7.8* 8.0*   ALKPHOS 53 54   ALT 18 19   AST 28 26   BILITOT 0.2 0.5     Microbiology Results (last 7 days)       Procedure Component Value Units Date/Time    Wound Culture [[id number]] Collected: 06/27/25 1828    Order Status: Completed Specimen: Drainage from Back, Lower Updated: 06/28/25 0633     Wound Culture No Growth At 24 Hours    Blood Culture #1 **CANNOT BE ORDERED STAT** [[id number]] Collected: 06/27/25 1727    Order Status: Resulted Specimen: Blood Updated: 06/27/25 1727    Blood Culture #2 **CANNOT BE ORDERED STAT** [[id number]] Collected: 06/27/25 1725    Order Status: Resulted Specimen: Blood Updated: 06/27/25 1725             See below for Radiology    Assessment/Plan:  Left flank draining abscess and surrounding cellulitis  Failure of outpatient management of above     Hx: COPD, HTN, DM2    Plan:  Patient looks comfortable  Has been afebrile  Will continue iv Vanc + zosyn   Surgery team consulted for I&D     Blood sugars stable. Added lantus q hs     Reviewed today's labs and  WBC 10, Hb 15, Plt 269, , K 4, Crt 1.01    Continue supportive care     VTE prophylaxis: Eliquis     Patient condition:  Fair    Anticipated discharge and Disposition:   Home in 24-48 hrs       All diagnosis and differential diagnosis have been reviewed; assessment and plan has been documented; I have personally reviewed the labs and test results that are presently available; I have reviewed the patients medication list; I have reviewed the consulting providers response and recommendations. I have reviewed or attempted to review medical records based upon their availability    All of the  patient's questions have been  addressed and answered. Patient's is agreeable to the above stated plan. I will continue to monitor closely and make adjustments to medical management as needed.    Portions of this note dictated using EMR integrated voice recognition software, and may be subject to voice recognition errors not corrected at proofreading. Please contact writer for clarification if needed.   _____________________________________________________________________    Malnutrition Status:  Nutrition consulted. Most recent weight and BMI monitored-     Measurements:  Wt Readings from Last 1 Encounters:   06/28/25 99.8 kg (220 lb 0.3 oz)   Body mass index is 32.49 kg/m².    Patient has been screened and assessed by RD.    Malnutrition Type:  Context:    Level:      Malnutrition Characteristic Summary:       Interventions/Recommendations (treatment strategy):        Scheduled Med:   aspirin  81 mg Oral Daily    atorvastatin  40 mg Oral Daily    enoxparin  40 mg Subcutaneous Daily    lisinopriL  20 mg Oral Daily    And    hydroCHLOROthiazide  25 mg Oral Daily    metoprolol succinate  50 mg Oral Daily    piperacillin-tazobactam (Zosyn) IV (PEDS and ADULTS) (extended infusion is not appropriate)  4.5 g Intravenous Q8H    vancomycin (VANCOCIN) 1,000 mg in D5W 250 mL IVPB (admixture device)  1,000 mg Intravenous Q12H      Continuous Infusions:     PRN Meds:    Current Facility-Administered Medications:     acetaminophen, 650 mg, Oral, Q8H PRN    acetaminophen, 650 mg, Oral, Q8H PRN    albuterol-ipratropium, 3 mL, Nebulization, Q4H PRN    dextrose 50%, 12.5 g, Intravenous, PRN    dextrose 50%, 25 g, Intravenous, PRN    glucagon (human recombinant), 1 mg, Intramuscular, PRN    glucose, 16 g, Oral, PRN    glucose, 24 g, Oral, PRN    insulin aspart U-100, 0-10 Units, Subcutaneous, QID (AC + HS) PRN    melatonin, 6 mg, Oral, Nightly PRN    nicotine, 1 patch, Transdermal, Daily PRN    ondansetron, 4 mg, Intravenous, Q8H  PRN    sodium chloride 0.9%, 10 mL, Intravenous, PRN    vancomycin - pharmacy to dose, , Intravenous, pharmacy to manage frequency     Radiology:  I have personally reviewed the following imaging and agree with the radiologist.     US Soft Tissue Chest_Upper Back  Narrative: EXAMINATION:  US SOFT TISSUE CHEST_UPPER BACK    CLINICAL HISTORY:  evaluation of possible abscess of left back;    TECHNIQUE:  Multiple Limited real-time images are performed of the left flank/back.    COMPARISON:  None available    FINDINGS:  There is small complex fluid collection consistent with an abscess with visible fistula tract which is open to the surface of the skin.  This measures 1.4 x 1.6 x 2.4 cm and is 1.5 cm deep from the skin surface.  Impression: Left flank/back abscess with fistula tract open to the skin surface.    Electronically signed by: Lyle Ziegler  Date:    06/27/2025  Time:    19:26      Maurice Martines MD  Department of Hospital Medicine   [facility]   06/28/2025

## 2025-06-28 NOTE — CONSULTS
Acute Care Surgery   Consult    Patient Name: Jemal Mcelroy Jr.  YOB: 1955  Date: 06/28/2025 1:13 PM  Date of Admission: 6/27/2025  HD#1  POD#* No surgery found *    PRESENTING HISTORY   Chief Complaint/Reason for Admission: Cellulitis of back  History source(s): patient and significant other  History of Present Illness:  This is a 69-year-old male with past medical history of COPD, hypertension, hyperlipidemia who presents to the ED for a persistent opening in his left flank.  He reports this started approximately 6 months ago and started to drain approximately 1.5 weeks ago.  He was presented to an outside hospital for which he was discharged with antibiotics with no improvement.  On examination in the ED, there appeared to be a area approximately 1 cm with active drainage.  Ultrasound showed a 1.4 x 1.6 by 1.2 cm abscess pocket with a fistulous tract to the surface.  Denies any fevers or chills.  Denies nausea, vomiting, chest pain, shortness of breath.    Review of Systems:  12 point ROS negative except as stated in HPI    PAST HISTORY:   Past medical history:  Past Medical History:   Diagnosis Date    Cancer     COPD (chronic obstructive pulmonary disease)     Hypertension     Personal history of colonic polyps        Past surgical history:  Past Surgical History:   Procedure Laterality Date    COLONOSCOPY  03/17/2017       Family history:  No family history on file.    Social history:  Social History     Socioeconomic History    Marital status:    Tobacco Use    Smoking status: Every Day     Current packs/day: 1.00     Types: Cigarettes    Smokeless tobacco: Never   Substance and Sexual Activity    Drug use: Yes     Frequency: 2.0 times per week     Types: Marijuana     Social Drivers of Health     Financial Resource Strain: Low Risk  (6/28/2025)    Overall Financial Resource Strain (CARDIA)     Difficulty of Paying Living Expenses: Not hard at all   Food Insecurity: No Food Insecurity  (6/28/2025)    Hunger Vital Sign     Worried About Running Out of Food in the Last Year: Never true     Ran Out of Food in the Last Year: Never true   Transportation Needs: No Transportation Needs (6/28/2025)    PRAPARE - Transportation     Lack of Transportation (Medical): No     Lack of Transportation (Non-Medical): No   Stress: No Stress Concern Present (6/28/2025)    [employer] - Occupational Stress Questionnaire     Feeling of Stress : Not at all   Housing Stability: Low Risk  (6/28/2025)    Housing Stability Vital Sign     Unable to Pay for Housing in the Last Year: No     Homeless in the Last Year: No     Tobacco Use History[1]   Social History     Substance and Sexual Activity   Alcohol Use None        MEDICATIONS & ALLERGIES:     No current facility-administered medications on file prior to encounter.     Current Outpatient Medications on File Prior to Encounter   Medication Sig    albuterol-ipratropium (DUO-NEB) 2.5 mg-0.5 mg/3 mL nebulizer solution Take 3 mLs by nebulization every 6 (six) hours while awake.    aspirin (ECOTRIN) 81 MG EC tablet Take 81 mg by mouth once daily.    atorvastatin (LIPITOR) 40 MG tablet Take 40 mg by mouth once daily.    enalapriL-hydrochlorothiazide (VASERETIC) 10-25 mg per tablet TAKE 1 TABLET EVERY DAY    ibuprofen (ADVIL,MOTRIN) 600 MG tablet Take 1 tablet (600 mg total) by mouth every 6 (six) hours as needed for Pain.    ibuprofen (ADVIL,MOTRIN) 800 MG tablet Take 1 tablet (800 mg total) by mouth every 6 (six) hours as needed for Pain.    lisinopriL-hydrochlorothiazide (PRINZIDE,ZESTORETIC) 10-12.5 mg per tablet Take 1 tablet by mouth once daily.    metoprolol succinate (TOPROL-XL) 50 MG 24 hr tablet Take 1 tablet (50 mg total) by mouth once daily.    pantoprazole (PROTONIX) 40 MG tablet Take 1 tablet (40 mg total) by mouth once daily.    albuterol (PROVENTIL/VENTOLIN HFA) 90 mcg/actuation inhaler Inhale 1-2 puffs into the lungs every 6 (six)  hours as needed for Wheezing. Rescue    azithromycin (Z-STEPHANIE) 250 MG tablet Take 1 tablet (250 mg total) by mouth once daily. Take first 2 tablets together, then 1 every day until finished.    gabapentin (NEURONTIN) 300 MG capsule Take 300 mg by mouth 3 (three) times daily.    glimepiride (AMARYL) 1 MG tablet TAKE 1 TABLET EVERY DAY    polyethylene glycol (MOVIPREP) 100-7.5-2.691 gram solution Take as directed prior to colonoscopy    [] sulfamethoxazole-trimethoprim 800-160mg (BACTRIM DS) 800-160 mg Tab Take 1 tablet by mouth 2 (two) times daily. for 7 days     Allergies:   Review of patient's allergies indicates:   Allergen Reactions    Metformin Diarrhea and Other (See Comments)     Other reaction(s): Other     Scheduled Meds:   aspirin  81 mg Oral Daily    atorvastatin  40 mg Oral Daily    enoxparin  40 mg Subcutaneous Daily    lisinopriL  20 mg Oral Daily    And    hydroCHLOROthiazide  25 mg Oral Daily    LIDOcaine-EPINEPHrine 1%-1:100,000  20 mL Intradermal Once    metoprolol succinate  50 mg Oral Daily    piperacillin-tazobactam (Zosyn) IV (PEDS and ADULTS) (extended infusion is not appropriate)  4.5 g Intravenous Q8H    vancomycin (VANCOCIN) 1,000 mg in D5W 250 mL IVPB (admixture device)  1,000 mg Intravenous Q12H     Continuous Infusions:  PRN Meds:  Current Facility-Administered Medications:     acetaminophen, 650 mg, Oral, Q8H PRN    acetaminophen, 650 mg, Oral, Q8H PRN    albuterol-ipratropium, 3 mL, Nebulization, Q4H PRN    dextrose 50%, 12.5 g, Intravenous, PRN    dextrose 50%, 25 g, Intravenous, PRN    glucagon (human recombinant), 1 mg, Intramuscular, PRN    glucose, 16 g, Oral, PRN    glucose, 24 g, Oral, PRN    insulin aspart U-100, 0-10 Units, Subcutaneous, QID (AC + HS) PRN    melatonin, 6 mg, Oral, Nightly PRN    nicotine, 1 patch, Transdermal, Daily PRN    ondansetron, 4 mg, Intravenous, Q8H PRN    sodium chloride 0.9%, 10 mL, Intravenous, PRN    vancomycin - pharmacy to dose, ,  "Intravenous, pharmacy to manage frequency    OBJECTIVE:   Vital Signs:  VITAL SIGNS: 24 HR MIN & MAX LAST   Temp  Min: 97.7 °F (36.5 °C)  Max: 98.6 °F (37 °C)  98.3 °F (36.8 °C)   BP  Min: 111/65  Max: 154/116  120/73    Pulse  Min: 75  Max: 100  77    Resp  Min: 16  Max: 24  16    SpO2  Min: 90 %  Max: 98 %  (!) 94 %      HT: 5' 9" (175.3 cm)  WT: 99.8 kg (220 lb 0.3 oz)  BMI: 32.5     Intake/output:  Intake/Output - Last 3 Shifts       None          No intake or output data in the 24 hours ending 06/28/25 1313      Physical Exam:  General: Well developed, well nourished, no acute distress  HEENT: Normocephalic, PERRL  CV: RR  Resp: NWOB  GI:  Abdomen soft, non-tender, non-distended, no guarding, no rebound  :  Deferred  MSK: No muscle atrophy, cyanosis, peripheral edema, moving all extremities spontaneously  Skin/wounds:  Left flank with an area of approximately 1 x 1 cm actively draining with purulence  Neuro:  CNII-XII grossly intact, alert and oriented to person, place, and time    Labs:  Troponin:  No results for input(s): "TROPONINI" in the last 72 hours.  CBC:  Recent Labs     06/27/25  1727 06/28/25  0436   WBC 9.00 10.35   RBC 4.26* 4.44*   HGB 14.8 15.1   HCT 45.0 46.9    269   .6* 105.6*   MCH 34.7* 34.0*   MCHC 32.9* 32.2*     CMP:  Recent Labs     06/27/25  1727 06/28/25  0436   * 102   CALCIUM 9.0 9.2   ALBUMIN 3.6 3.8   PROT 7.8* 8.0*    139   K 3.8 4.0   CO2 23 27    104   BUN 7.3* 7.1*   CREATININE 1.06 1.01   ALKPHOS 53 54   ALT 18 19   AST 28 26   BILITOT 0.2 0.5     Lactic Acid:  Recent Labs     06/27/25  1725   LACTATE 1.6     ETOH:  No results for input(s): "ETHANOL" in the last 72 hours.   Urine Drug Screen:  No results for input(s): "COCAINE", "OPIATE", "BARBITURATE", "AMPHETAMINE", "FENTANYL", "CANNABINOIDS", "MDMA" in the last 72 hours.    Invalid input(s): "BENZODIAZEPINE", "PHENCYCLIDINE"   ABG:  No results for input(s): "PH", "PO2", "PCO2", "HCO3", " ""BE" in the last 168 hours.   I have reviewed all pertinent lab results within the past 24 hours.    Diagnostic Results:  Imaging Results              US Soft Tissue Chest_Upper Back (Final result)  Result time 06/27/25 19:26:10      Final result by Lyle Ziegler MD (06/27/25 19:26:10)                   Impression:      Left flank/back abscess with fistula tract open to the skin surface.      Electronically signed by: Lyle Ziegler  Date:    06/27/2025  Time:    19:26               Narrative:    EXAMINATION:  US SOFT TISSUE CHEST_UPPER BACK    CLINICAL HISTORY:  evaluation of possible abscess of left back;    TECHNIQUE:  Multiple Limited real-time images are performed of the left flank/back.    COMPARISON:  None available    FINDINGS:  There is small complex fluid collection consistent with an abscess with visible fistula tract which is open to the surface of the skin.  This measures 1.4 x 1.6 x 2.4 cm and is 1.5 cm deep from the skin surface.                                     I have reviewed all pertinent imaging results/findings within the past 24 hours.    ASSESSMENT & PLAN:    This is a 69-year-old male with past medical history of COPD, hypertension, and diabetes who presents to the ED for induration and redness as well as purulent drainage.  Ultrasound showed a 1 x 1 x 1 cm fluid collection.  Area appeared to be actively draining.  Patient underwent a successful incision and drainage at bedside with a small amount of sanguinous/purulence expressed.  Wound was packed with iodoform dressing and appeared to be hemostatic.    -underwent incision and drainage at bedside   -consent obtained   -wound cultures obtained  -packing to be removed tomorrow and redressed with 4x4s; repack if still draining  -surgery will sign off, please reach out with any further questions or concerns    Bret Henao MD  [employer] [facility] HO-1       [1]   Social History  Tobacco Use   Smoking Status Every Day    Current packs/day: " 1.00    Types: Cigarettes   Smokeless Tobacco Never

## 2025-06-29 LAB
ANION GAP SERPL CALC-SCNC: 8 MEQ/L
BASOPHILS # BLD AUTO: 0.12 X10(3)/MCL
BASOPHILS NFR BLD AUTO: 1.5 %
BUN SERPL-MCNC: 7.9 MG/DL (ref 8.4–25.7)
CALCIUM SERPL-MCNC: 8.7 MG/DL (ref 8.8–10)
CHLORIDE SERPL-SCNC: 100 MMOL/L (ref 98–107)
CO2 SERPL-SCNC: 30 MMOL/L (ref 23–31)
CREAT SERPL-MCNC: 1.06 MG/DL (ref 0.72–1.25)
CREAT/UREA NIT SERPL: 7
EOSINOPHIL # BLD AUTO: 0.48 X10(3)/MCL (ref 0–0.9)
EOSINOPHIL NFR BLD AUTO: 5.8 %
ERYTHROCYTE [DISTWIDTH] IN BLOOD BY AUTOMATED COUNT: 13.4 % (ref 11.5–17)
GFR SERPLBLD CREATININE-BSD FMLA CKD-EPI: >60 ML/MIN/1.73/M2
GLUCOSE SERPL-MCNC: 91 MG/DL (ref 82–115)
HCT VFR BLD AUTO: 44.3 % (ref 42–52)
HGB BLD-MCNC: 14.6 G/DL (ref 14–18)
IMM GRANULOCYTES # BLD AUTO: 0.03 X10(3)/MCL (ref 0–0.04)
IMM GRANULOCYTES NFR BLD AUTO: 0.4 %
LYMPHOCYTES # BLD AUTO: 2.17 X10(3)/MCL (ref 0.6–4.6)
LYMPHOCYTES NFR BLD AUTO: 26.4 %
MCH RBC QN AUTO: 34.7 PG (ref 27–31)
MCHC RBC AUTO-ENTMCNC: 33 G/DL (ref 33–36)
MCV RBC AUTO: 105.2 FL (ref 80–94)
MONOCYTES # BLD AUTO: 0.73 X10(3)/MCL (ref 0.1–1.3)
MONOCYTES NFR BLD AUTO: 8.9 %
MRSA PCR SCRN (OHS): DETECTED
NEUTROPHILS # BLD AUTO: 4.68 X10(3)/MCL (ref 2.1–9.2)
NEUTROPHILS NFR BLD AUTO: 57 %
NRBC BLD AUTO-RTO: 0 %
PLATELET # BLD AUTO: 229 X10(3)/MCL (ref 130–400)
PMV BLD AUTO: 9.7 FL (ref 7.4–10.4)
POTASSIUM SERPL-SCNC: 4.3 MMOL/L (ref 3.5–5.1)
RBC # BLD AUTO: 4.21 X10(6)/MCL (ref 4.7–6.1)
SODIUM SERPL-SCNC: 138 MMOL/L (ref 136–145)
VANCOMYCIN TROUGH SERPL-MCNC: 10.9 UG/ML (ref 15–20)
WBC # BLD AUTO: 8.21 X10(3)/MCL (ref 4.5–11.5)

## 2025-06-29 PROCEDURE — 99900031 HC PATIENT EDUCATION (STAT)

## 2025-06-29 PROCEDURE — 94760 N-INVAS EAR/PLS OXIMETRY 1: CPT

## 2025-06-29 PROCEDURE — 94761 N-INVAS EAR/PLS OXIMETRY MLT: CPT

## 2025-06-29 PROCEDURE — 99900035 HC TECH TIME PER 15 MIN (STAT)

## 2025-06-29 PROCEDURE — 80048 BASIC METABOLIC PNL TOTAL CA: CPT | Performed by: INTERNAL MEDICINE

## 2025-06-29 PROCEDURE — 94640 AIRWAY INHALATION TREATMENT: CPT

## 2025-06-29 PROCEDURE — 21400001 HC TELEMETRY ROOM

## 2025-06-29 PROCEDURE — 25000242 PHARM REV CODE 250 ALT 637 W/ HCPCS: Performed by: NURSE PRACTITIONER

## 2025-06-29 PROCEDURE — 63600175 PHARM REV CODE 636 W HCPCS: Performed by: PHYSICIAN ASSISTANT

## 2025-06-29 PROCEDURE — 25000003 PHARM REV CODE 250: Performed by: PHYSICIAN ASSISTANT

## 2025-06-29 PROCEDURE — 63600175 PHARM REV CODE 636 W HCPCS: Performed by: INTERNAL MEDICINE

## 2025-06-29 PROCEDURE — 85025 COMPLETE CBC W/AUTO DIFF WBC: CPT | Performed by: INTERNAL MEDICINE

## 2025-06-29 PROCEDURE — 80202 ASSAY OF VANCOMYCIN: CPT | Performed by: INTERNAL MEDICINE

## 2025-06-29 PROCEDURE — 27000221 HC OXYGEN, UP TO 24 HOURS

## 2025-06-29 PROCEDURE — 25000003 PHARM REV CODE 250: Performed by: INTERNAL MEDICINE

## 2025-06-29 PROCEDURE — 87641 MR-STAPH DNA AMP PROBE: CPT | Performed by: INTERNAL MEDICINE

## 2025-06-29 PROCEDURE — 36415 COLL VENOUS BLD VENIPUNCTURE: CPT | Performed by: INTERNAL MEDICINE

## 2025-06-29 RX ADMIN — PIPERACILLIN SODIUM AND TAZOBACTAM SODIUM 4.5 G: 4; .5 INJECTION, POWDER, LYOPHILIZED, FOR SOLUTION INTRAVENOUS at 06:06

## 2025-06-29 RX ADMIN — HYDROCHLOROTHIAZIDE 25 MG: 25 TABLET ORAL at 09:06

## 2025-06-29 RX ADMIN — ATORVASTATIN CALCIUM 40 MG: 40 TABLET, FILM COATED ORAL at 09:06

## 2025-06-29 RX ADMIN — LISINOPRIL 20 MG: 20 TABLET ORAL at 09:06

## 2025-06-29 RX ADMIN — ENOXAPARIN SODIUM 40 MG: 40 INJECTION SUBCUTANEOUS at 06:06

## 2025-06-29 RX ADMIN — IPRATROPIUM BROMIDE AND ALBUTEROL SULFATE 3 ML: 2.5; .5 SOLUTION RESPIRATORY (INHALATION) at 12:06

## 2025-06-29 RX ADMIN — VANCOMYCIN HYDROCHLORIDE 1000 MG: 1 INJECTION, POWDER, LYOPHILIZED, FOR SOLUTION INTRAVENOUS at 11:06

## 2025-06-29 RX ADMIN — IPRATROPIUM BROMIDE AND ALBUTEROL SULFATE 3 ML: 2.5; .5 SOLUTION RESPIRATORY (INHALATION) at 08:06

## 2025-06-29 RX ADMIN — PIPERACILLIN SODIUM AND TAZOBACTAM SODIUM 4.5 G: 4; .5 INJECTION, POWDER, LYOPHILIZED, FOR SOLUTION INTRAVENOUS at 03:06

## 2025-06-29 RX ADMIN — IPRATROPIUM BROMIDE AND ALBUTEROL SULFATE 3 ML: 2.5; .5 SOLUTION RESPIRATORY (INHALATION) at 04:06

## 2025-06-29 RX ADMIN — PIPERACILLIN SODIUM AND TAZOBACTAM SODIUM 4.5 G: 4; .5 INJECTION, POWDER, LYOPHILIZED, FOR SOLUTION INTRAVENOUS at 09:06

## 2025-06-29 RX ADMIN — ASPIRIN 81 MG: 81 TABLET, DELAYED RELEASE ORAL at 09:06

## 2025-06-29 RX ADMIN — METOPROLOL SUCCINATE 50 MG: 50 TABLET, EXTENDED RELEASE ORAL at 09:06

## 2025-06-29 NOTE — PROGRESS NOTES
Pharmacokinetic Assessment Follow Up: IV Vancomycin    Vancomycin serum concentration assessment(s):    The trough level was drawn correctly and can be used to guide therapy at this time. The measurement is within the desired definitive target range of 10 to 20 mcg/mL.    Vancomycin Regimen Plan:    Continue regimen to Vancomycin 1000 mg IV every 12 hours with next serum trough concentration measured at 1100 prior to   dose on 07/01.    Drug levels (last 3 results):  Recent Labs   Lab Result Units 06/29/25  0849   Vancomycin Trough ug/ml 10.9*       Pharmacy will continue to follow and monitor vancomycin.    Please contact pharmacy at extension [phone] for questions regarding this assessment.    Thank you for the consult,   Joyce Garay       Patient brief summary:  Jemal Mcelroy Jr. is a 69 y.o. male initiated on antimicrobial therapy with IV Vancomycin for treatment of skin & soft tissue infection    The patient's current regimen is 1000mg q12h.    Drug Allergies:   Review of patient's allergies indicates:   Allergen Reactions    Metformin Diarrhea and Other (See Comments)     Other reaction(s): Other       Actual Body Weight:   99.8kg    Renal Function:   Estimated Creatinine Clearance: 78.7 mL/min (based on SCr of 1.06 mg/dL).,     Dialysis Method (if applicable):  N/A    CBC (last 72 hours):  Recent Labs   Lab Result Units 06/27/25  1727 06/28/25  0436 06/29/25  0459   WBC x10(3)/mcL 9.00 10.35 8.21   Hgb g/dL 14.8 15.1 14.6   Hct % 45.0 46.9 44.3   Platelet x10(3)/mcL 236 269 229   Mono % % 8.8 8.9 8.9   Eos % % 6.0 5.6 5.8   Basophil % % 1.3 1.3 1.5       Metabolic Panel (last 72 hours):  Recent Labs   Lab Result Units 06/27/25  1727 06/28/25  0436 06/29/25  0459   Sodium mmol/L 136 139 138   Potassium mmol/L 3.8 4.0 4.3   Chloride mmol/L 104 104 100   CO2 mmol/L 23 27 30   Glucose mg/dL 121* 102 91   Blood Urea Nitrogen mg/dL 7.3* 7.1* 7.9*   Creatinine mg/dL 1.06 1.01 1.06   Albumin g/dL 3.6 3.8  --    Bilirubin  Total mg/dL 0.2 0.5  --    ALP unit/L 53 54  --    AST unit/L 28 26  --    ALT unit/L 18 19  --        Vancomycin Administrations:  vancomycin given in the last 96 hours                     vancomycin (VANCOCIN) 1,000 mg in D5W 250 mL IVPB (admixture device) (mg) 1,000 mg New Bag 06/29/25 1149     1,000 mg New Bag 06/28/25 2117     1,000 mg New Bag  0936    vancomycin (VANCOCIN) 1,000 mg in D5W 250 mL IVPB (admixture device) (mg) 1,000 mg New Bag 06/27/25 2004    vancomycin 1,250 mg in D5W 250 mL IVPB (admixture device) (mg) 1,250 mg New Bag 06/27/25 2004                    Microbiologic Results:  Microbiology Results (last 7 days)       Procedure Component Value Units Date/Time    Wound Culture [[id number]] Collected: 06/27/25 1828    Order Status: Completed Specimen: Drainage from Back, Lower Updated: 06/29/25 0731     Wound Culture Rare normal skin stephanie, no further workup.    Wound Culture [[id number]] Collected: 06/28/25 1409    Order Status: Completed Specimen: Abscess Updated: 06/29/25 0656     Wound Culture No Growth At 24 Hours    Blood Culture #2 **CANNOT BE ORDERED STAT** [[id number]]  (Normal) Collected: 06/27/25 1725    Order Status: Completed Specimen: Blood Updated: 06/28/25 1902     Blood Culture No Growth At 24 Hours    Blood Culture #1 **CANNOT BE ORDERED STAT** [[id number]]  (Normal) Collected: 06/27/25 1727    Order Status: Completed Specimen: Blood Updated: 06/28/25 1902     Blood Culture No Growth At 24 Hours

## 2025-06-29 NOTE — PROGRESS NOTES
[facility] Hospital Medicine Progress Note        Chief Complaint: Inpatient Follow-up for left flank abscess     HPI:   Patient is a 69-year-old male with a past medical history of COPD, HTN, DM2, and additional past medical history as below who presents to the ER complaining of a persistent opening on his left flank with pain.  Patient reports developing a local abscess in this region that is spontaneously opened and started to drain about a week and a half ago.  He received courses of antibiotics to the ER with no significant improvement which prompted his visit tonight.     Patient arrived to the ER afebrile, hemodynamically stable satting in the mid 90s on room air.  Laboratory work was overall unremarkable.  An ultrasound of the area showed left flank/back abscess with a fistula trach open to the surface.  Blood and wound cultures were obtained, and broad-spectrum antibiotics initiated.    He has a significant area of redness and induration. Surgery team consulted for I&D. US showed fluid collection. Seen by surgery team and bedside I&D done.     Interval Hx:   Patient today awake and comfortable. Has no fever or chills. Eating well.    Case was discussed with patient's nurse and  on the floor.    Objective/physical exam:  General: In no acute distress  Chest: Clear to auscultation bilaterally  Heart: RRR, +S1, S2, no appreciable murmur  Abdomen: Soft, nontender, BS +  MSK: left flank + bandages   Neurologic: Alert and oriented x4, Cranial nerve II-XII intact, Strength 5/5 in all 4 extremities    VITAL SIGNS: 24 HRS MIN & MAX LAST   Temp  Min: 97.7 °F (36.5 °C)  Max: 98.3 °F (36.8 °C) 98.2 °F (36.8 °C)   BP  Min: 120/73  Max: 153/86 132/69   Pulse  Min: 70  Max: 89  83   Resp  Min: 15  Max: 21 18   SpO2  Min: 89 %  Max: 98 % (!) 89 %     I have reviewed the following labs:  Recent Labs   Lab 06/27/25  1727 06/28/25  0436 06/29/25  0459   WBC 9.00 10.35 8.21   RBC  4.26* 4.44* 4.21*   HGB 14.8 15.1 14.6   HCT 45.0 46.9 44.3   .6* 105.6* 105.2*   MCH 34.7* 34.0* 34.7*   MCHC 32.9* 32.2* 33.0   RDW 13.6 13.4 13.4    269 229   MPV 9.4 9.9 9.7     Recent Labs   Lab 06/27/25  1727 06/28/25  0436 06/29/25  0459    139 138   K 3.8 4.0 4.3    104 100   CO2 23 27 30   BUN 7.3* 7.1* 7.9*   CREATININE 1.06 1.01 1.06   * 102 91   CALCIUM 9.0 9.2 8.7*   ALBUMIN 3.6 3.8  --    PROT 7.8* 8.0*  --    ALKPHOS 53 54  --    ALT 18 19  --    AST 28 26  --    BILITOT 0.2 0.5  --      Microbiology Results (last 7 days)       Procedure Component Value Units Date/Time    Wound Culture [[id number]] Collected: 06/27/25 1828    Order Status: Completed Specimen: Drainage from Back, Lower Updated: 06/29/25 0731     Wound Culture Rare normal skin stephanie, no further workup.    Wound Culture [[id number]] Collected: 06/28/25 1409    Order Status: Completed Specimen: Abscess Updated: 06/29/25 0656     Wound Culture No Growth At 24 Hours    Blood Culture #2 **CANNOT BE ORDERED STAT** [[id number]]  (Normal) Collected: 06/27/25 1725    Order Status: Completed Specimen: Blood Updated: 06/28/25 1902     Blood Culture No Growth At 24 Hours    Blood Culture #1 **CANNOT BE ORDERED STAT** [[id number]]  (Normal) Collected: 06/27/25 1727    Order Status: Completed Specimen: Blood Updated: 06/28/25 1902     Blood Culture No Growth At 24 Hours             See below for Radiology    Assessment/Plan:  Left flank draining abscess and surrounding cellulitis  Failure of outpatient management of above     Hx: COPD, HTN, DM2    Plan:  Patient has no new issues  S/P I&D   Has been afebrile  Will continue iv Vanc + zosyn for today   F/U on cultures     Blood sugars stable. Added lantus q hs     Reviewed today's labs and  WBC 8.21, Hb 14, Plt 229, , K 4.3, Crt 1.06    Continue supportive care     VTE prophylaxis: Eliquis     Patient condition:  Fair    Anticipated discharge and Disposition:    Home in 24-48 hrs       All diagnosis and differential diagnosis have been reviewed; assessment and plan has been documented; I have personally reviewed the labs and test results that are presently available; I have reviewed the patients medication list; I have reviewed the consulting providers response and recommendations. I have reviewed or attempted to review medical records based upon their availability    All of the patient's questions have been  addressed and answered. Patient's is agreeable to the above stated plan. I will continue to monitor closely and make adjustments to medical management as needed.    Portions of this note dictated using EMR integrated voice recognition software, and may be subject to voice recognition errors not corrected at proofreading. Please contact writer for clarification if needed.   _____________________________________________________________________    Malnutrition Status:  Nutrition consulted. Most recent weight and BMI monitored-     Measurements:  Wt Readings from Last 1 Encounters:   06/28/25 99.8 kg (220 lb 0.3 oz)   Body mass index is 32.49 kg/m².    Patient has been screened and assessed by RD.    Malnutrition Type:  Context:    Level:      Malnutrition Characteristic Summary:       Interventions/Recommendations (treatment strategy):        Scheduled Med:   aspirin  81 mg Oral Daily    atorvastatin  40 mg Oral Daily    enoxparin  40 mg Subcutaneous Daily    lisinopriL  20 mg Oral Daily    And    hydroCHLOROthiazide  25 mg Oral Daily    metoprolol succinate  50 mg Oral Daily    piperacillin-tazobactam (Zosyn) IV (PEDS and ADULTS) (extended infusion is not appropriate)  4.5 g Intravenous Q8H    vancomycin (VANCOCIN) 1,000 mg in D5W 250 mL IVPB (admixture device)  1,000 mg Intravenous Q12H      Continuous Infusions:     PRN Meds:    Current Facility-Administered Medications:     acetaminophen, 650 mg, Oral, Q8H PRN    acetaminophen, 650 mg, Oral, Q8H PRN     albuterol-ipratropium, 3 mL, Nebulization, Q4H PRN    dextrose 50%, 12.5 g, Intravenous, PRN    dextrose 50%, 25 g, Intravenous, PRN    glucagon (human recombinant), 1 mg, Intramuscular, PRN    glucose, 16 g, Oral, PRN    glucose, 24 g, Oral, PRN    insulin aspart U-100, 0-10 Units, Subcutaneous, QID (AC + HS) PRN    melatonin, 6 mg, Oral, Nightly PRN    nicotine, 1 patch, Transdermal, Daily PRN    ondansetron, 4 mg, Intravenous, Q8H PRN    oxyCODONE, 5 mg, Oral, Q6H PRN    sodium chloride 0.9%, 10 mL, Intravenous, PRN    vancomycin - pharmacy to dose, , Intravenous, pharmacy to manage frequency     Radiology:  I have personally reviewed the following imaging and agree with the radiologist.     US Soft Tissue Chest_Upper Back  Narrative: EXAMINATION:  US SOFT TISSUE CHEST_UPPER BACK    CLINICAL HISTORY:  evaluation of possible abscess of left back;    TECHNIQUE:  Multiple Limited real-time images are performed of the left flank/back.    COMPARISON:  None available    FINDINGS:  There is small complex fluid collection consistent with an abscess with visible fistula tract which is open to the surface of the skin.  This measures 1.4 x 1.6 x 2.4 cm and is 1.5 cm deep from the skin surface.  Impression: Left flank/back abscess with fistula tract open to the skin surface.    Electronically signed by: Lyle Ziegler  Date:    06/27/2025  Time:    19:26      Maurice Martines MD  Department of Hospital Medicine   [facility]   06/29/2025

## 2025-06-30 VITALS
BODY MASS INDEX: 32.58 KG/M2 | SYSTOLIC BLOOD PRESSURE: 124 MMHG | TEMPERATURE: 98 F | OXYGEN SATURATION: 90 % | RESPIRATION RATE: 17 BRPM | WEIGHT: 220 LBS | HEART RATE: 82 BPM | DIASTOLIC BLOOD PRESSURE: 81 MMHG | HEIGHT: 69 IN

## 2025-06-30 LAB
ANION GAP SERPL CALC-SCNC: 9 MEQ/L
BACTERIA WND CULT: NORMAL
BUN SERPL-MCNC: 11.2 MG/DL (ref 8.4–25.7)
CALCIUM SERPL-MCNC: 9.5 MG/DL (ref 8.8–10)
CHLORIDE SERPL-SCNC: 102 MMOL/L (ref 98–107)
CO2 SERPL-SCNC: 27 MMOL/L (ref 23–31)
CREAT SERPL-MCNC: 0.95 MG/DL (ref 0.72–1.25)
CREAT/UREA NIT SERPL: 12
GFR SERPLBLD CREATININE-BSD FMLA CKD-EPI: >60 ML/MIN/1.73/M2
GLUCOSE SERPL-MCNC: 104 MG/DL (ref 82–115)
POCT GLUCOSE: 99 MG/DL (ref 70–110)
POTASSIUM SERPL-SCNC: 3.8 MMOL/L (ref 3.5–5.1)
SODIUM SERPL-SCNC: 138 MMOL/L (ref 136–145)

## 2025-06-30 PROCEDURE — 80048 BASIC METABOLIC PNL TOTAL CA: CPT | Performed by: INTERNAL MEDICINE

## 2025-06-30 PROCEDURE — 63600175 PHARM REV CODE 636 W HCPCS: Performed by: INTERNAL MEDICINE

## 2025-06-30 PROCEDURE — 25000003 PHARM REV CODE 250: Performed by: PHYSICIAN ASSISTANT

## 2025-06-30 PROCEDURE — 99900031 HC PATIENT EDUCATION (STAT)

## 2025-06-30 PROCEDURE — 94761 N-INVAS EAR/PLS OXIMETRY MLT: CPT

## 2025-06-30 PROCEDURE — 36415 COLL VENOUS BLD VENIPUNCTURE: CPT | Performed by: INTERNAL MEDICINE

## 2025-06-30 PROCEDURE — 94640 AIRWAY INHALATION TREATMENT: CPT

## 2025-06-30 PROCEDURE — 25000003 PHARM REV CODE 250: Performed by: INTERNAL MEDICINE

## 2025-06-30 PROCEDURE — 99900035 HC TECH TIME PER 15 MIN (STAT)

## 2025-06-30 PROCEDURE — 25000242 PHARM REV CODE 250 ALT 637 W/ HCPCS: Performed by: NURSE PRACTITIONER

## 2025-06-30 PROCEDURE — 63600175 PHARM REV CODE 636 W HCPCS: Performed by: PHYSICIAN ASSISTANT

## 2025-06-30 RX ORDER — IBUPROFEN 200 MG
1 TABLET ORAL DAILY PRN
Qty: 30 PATCH | Refills: 0 | Status: SHIPPED | OUTPATIENT
Start: 2025-06-30 | End: 2025-07-30

## 2025-06-30 RX ORDER — DOXYCYCLINE HYCLATE 100 MG
100 TABLET ORAL EVERY 12 HOURS
Status: DISCONTINUED | OUTPATIENT
Start: 2025-06-30 | End: 2025-06-30 | Stop reason: HOSPADM

## 2025-06-30 RX ORDER — DOXYCYCLINE HYCLATE 100 MG
100 TABLET ORAL EVERY 12 HOURS
Qty: 14 TABLET | Refills: 0 | Status: SHIPPED | OUTPATIENT
Start: 2025-06-30 | End: 2025-06-30

## 2025-06-30 RX ORDER — DOXYCYCLINE HYCLATE 100 MG
100 TABLET ORAL EVERY 12 HOURS
Qty: 20 TABLET | Refills: 0 | Status: SHIPPED | OUTPATIENT
Start: 2025-06-30 | End: 2025-06-30

## 2025-06-30 RX ORDER — ACETAMINOPHEN 325 MG/1
650 TABLET ORAL EVERY 8 HOURS PRN
COMMUNITY
Start: 2025-06-30

## 2025-06-30 RX ORDER — DOXYCYCLINE HYCLATE 100 MG
100 TABLET ORAL EVERY 12 HOURS
Qty: 14 TABLET | Refills: 0 | Status: SHIPPED | OUTPATIENT
Start: 2025-06-30 | End: 2025-07-07

## 2025-06-30 RX ADMIN — ASPIRIN 81 MG: 81 TABLET, DELAYED RELEASE ORAL at 09:06

## 2025-06-30 RX ADMIN — IPRATROPIUM BROMIDE AND ALBUTEROL SULFATE 3 ML: 2.5; .5 SOLUTION RESPIRATORY (INHALATION) at 04:06

## 2025-06-30 RX ADMIN — LISINOPRIL 20 MG: 20 TABLET ORAL at 09:06

## 2025-06-30 RX ADMIN — METOPROLOL SUCCINATE 50 MG: 50 TABLET, EXTENDED RELEASE ORAL at 09:06

## 2025-06-30 RX ADMIN — PIPERACILLIN SODIUM AND TAZOBACTAM SODIUM 4.5 G: 4; .5 INJECTION, POWDER, LYOPHILIZED, FOR SOLUTION INTRAVENOUS at 01:06

## 2025-06-30 RX ADMIN — VANCOMYCIN HYDROCHLORIDE 1000 MG: 1 INJECTION, POWDER, LYOPHILIZED, FOR SOLUTION INTRAVENOUS at 12:06

## 2025-06-30 RX ADMIN — HYDROCHLOROTHIAZIDE 25 MG: 25 TABLET ORAL at 09:06

## 2025-06-30 RX ADMIN — DOXYCYCLINE HYCLATE 100 MG: 100 TABLET, COATED ORAL at 09:06

## 2025-06-30 RX ADMIN — ATORVASTATIN CALCIUM 40 MG: 40 TABLET, FILM COATED ORAL at 09:06

## 2025-06-30 RX ADMIN — IPRATROPIUM BROMIDE AND ALBUTEROL SULFATE 3 ML: 2.5; .5 SOLUTION RESPIRATORY (INHALATION) at 01:06

## 2025-06-30 NOTE — DISCHARGE SUMMARY
[facility] Hospital Medicine Discharge Summary    Admit Date: 6/27/2025  Discharge Date and Time: 6/30/20259:29 AM  Admitting Physician: CARMINA Team  Discharging Physician: Maurice Martines MD.  Primary Care Physician: Ginny, Primary Doctor  Consults: General Surgery    Discharge Diagnoses:  Left flank draining abscess and surrounding cellulitis  Failure of outpatient management of above     Hx: COPD, HTN, DM2    Hospital Course:   Patient is a 69-year-old male with a past medical history of COPD, HTN, DM2, and additional past medical history as below who presents to the ER complaining of a persistent opening on his left flank with pain.  Patient reports developing a local abscess in this region that is spontaneously opened and started to drain about a week and a half ago.  He received courses of antibiotics to the ER with no significant improvement which prompted his visit tonight.     Patient arrived to the ER afebrile, hemodynamically stable satting in the mid 90s on room air.  Laboratory work was overall unremarkable.  An ultrasound of the area showed left flank/back abscess with a fistula trach open to the surface.  Blood and wound cultures were obtained, and broad-spectrum antibiotics initiated.     He has a significant area of redness and induration. Surgery team consulted for I&D. US showed fluid collection. Seen by surgery team and bedside I&D done. Cultures continued to be negative, he was afebrile. He was switched to po doxy for 7 days and discharged home with  for wound care.     Pt was seen and examined on the day of discharge  Vitals:  VITAL SIGNS: 24 HRS MIN & MAX LAST   Temp  Min: 98 °F (36.7 °C)  Max: 98.2 °F (36.8 °C) 98 °F (36.7 °C)   BP  Min: 119/73  Max: 148/91 119/73   Pulse  Min: 79  Max: 88  79   Resp  Min: 18  Max: 18 18   SpO2  Min: 89 %  Max: 93 % (!) 91 %       Physical Exam:  Heart RRR  Lungs clear   Abdomen soft and non tender   Neuro: No FND       Procedures Performed: No admission procedures for hospital encounter.     Significant Diagnostic Studies: See Full reports for all details    Recent Labs   Lab 06/27/25 1727 06/28/25 0436 06/29/25 0459   WBC 9.00 10.35 8.21   RBC 4.26* 4.44* 4.21*   HGB 14.8 15.1 14.6   HCT 45.0 46.9 44.3   .6* 105.6* 105.2*   MCH 34.7* 34.0* 34.7*   MCHC 32.9* 32.2* 33.0   RDW 13.6 13.4 13.4    269 229   MPV 9.4 9.9 9.7       Recent Labs   Lab 06/27/25 1727 06/28/25 0436 06/29/25 0459 06/30/25 0418    139 138 138   K 3.8 4.0 4.3 3.8    104 100 102   CO2 23 27 30 27   BUN 7.3* 7.1* 7.9* 11.2   CREATININE 1.06 1.01 1.06 0.95   * 102 91 104   CALCIUM 9.0 9.2 8.7* 9.5   ALBUMIN 3.6 3.8  --   --    PROT 7.8* 8.0*  --   --    ALKPHOS 53 54  --   --    ALT 18 19  --   --    AST 28 26  --   --    BILITOT 0.2 0.5  --   --         Microbiology Results (last 7 days)       Procedure Component Value Units Date/Time    Wound Culture [[id number]] Collected: 06/27/25 1828    Order Status: Completed Specimen: Drainage from Back, Lower Updated: 06/30/25 0919     Wound Culture Rare normal skin stephanie, no further workup.    Wound Culture [[id number]] Collected: 06/28/25 1409    Order Status: Completed Specimen: Abscess Updated: 06/30/25 0801     Wound Culture No Growth At 48 Hours    Blood Culture #2 **CANNOT BE ORDERED STAT** [[id number]]  (Normal) Collected: 06/27/25 1725    Order Status: Completed Specimen: Blood Updated: 06/29/25 1901     Blood Culture No Growth At 48 Hours    Blood Culture #1 **CANNOT BE ORDERED STAT** [[id number]]  (Normal) Collected: 06/27/25 1727    Order Status: Completed Specimen: Blood Updated: 06/29/25 1901     Blood Culture No Growth At 48 Hours             US Soft Tissue Chest_Upper Back  Narrative: EXAMINATION:  US SOFT TISSUE CHEST_UPPER BACK    CLINICAL HISTORY:  evaluation of possible abscess of left back;    TECHNIQUE:  Multiple Limited real-time images are performed of the  left flank/back.    COMPARISON:  None available    FINDINGS:  There is small complex fluid collection consistent with an abscess with visible fistula tract which is open to the surface of the skin.  This measures 1.4 x 1.6 x 2.4 cm and is 1.5 cm deep from the skin surface.  Impression: Left flank/back abscess with fistula tract open to the skin surface.    Electronically signed by: Lyle Ziegler  Date:    06/27/2025  Time:    19:26         Medication List        START taking these medications      acetaminophen 325 MG tablet  Commonly known as: TYLENOL  Take 2 tablets (650 mg total) by mouth every 8 (eight) hours as needed for Pain.     doxycycline 100 MG tablet  Commonly known as: VIBRA-TABS  Take 1 tablet (100 mg total) by mouth every 12 (twelve) hours. for 7 days     nicotine 14 mg/24 hr  Commonly known as: NICODERM CQ  Place 1 patch onto the skin daily as needed.            CONTINUE taking these medications      albuterol 90 mcg/actuation inhaler  Commonly known as: PROVENTIL/VENTOLIN HFA  Inhale 1-2 puffs into the lungs every 6 (six) hours as needed for Wheezing. Rescue     albuterol-ipratropium 2.5 mg-0.5 mg/3 mL nebulizer solution  Commonly known as: DUO-NEB     aspirin 81 MG EC tablet  Commonly known as: ECOTRIN     atorvastatin 40 MG tablet  Commonly known as: LIPITOR     gabapentin 300 MG capsule  Commonly known as: NEURONTIN     lisinopriL-hydrochlorothiazide 10-12.5 mg per tablet  Commonly known as: PRINZIDE,ZESTORETIC     metoprolol succinate 50 MG 24 hr tablet  Commonly known as: TOPROL-XL  Take 1 tablet (50 mg total) by mouth once daily.     pantoprazole 40 MG tablet  Commonly known as: PROTONIX  Take 1 tablet (40 mg total) by mouth once daily.            STOP taking these medications      azithromycin 250 MG tablet  Commonly known as: Z-STEPHANIE     enalapriL-hydrochlorothiazide 10-25 mg per tablet  Commonly known as: VASERETIC     glimepiride 1 MG tablet  Commonly known as: AMARYL     ibuprofen 600 MG  tablet  Commonly known as: ADVIL,MOTRIN     ibuprofen 800 MG tablet  Commonly known as: ADVIL,MOTRIN     polyethylene glycol 100-7.5-2.691 gram solution  Commonly known as: MoviPrep     sulfamethoxazole-trimethoprim 800-160mg 800-160 mg Tab  Commonly known as: BACTRIM DS               Where to Get Your Medications        These medications were sent to [facility] - [place], [place] - [address]  [address], [facility] [address]      Phone: [phone]   doxycycline 100 MG tablet  nicotine 14 mg/24 hr       You can get these medications from any pharmacy    You don't need a prescription for these medications  acetaminophen 325 MG tablet          Explained in detail to the patient about the discharge plan, medications, and follow-up visits. Pt understands and agrees with the treatment plan  Discharge Disposition: Home with HH   Discharged Condition: stable  Diet-   Dietary Orders (From admission, onward)       Start     Ordered    06/27/25 2006  Diet Consistent Carbohydrate 2000 Calories (up to 75 gm per meal)  (Diet/Nutrition - [facility] Locations)  Diet effective now        Question:  Total calories / carbs:  Answer:  2000 Calories (up to 75 gm per meal)    06/27/25 2005                   Medications Per DC med rec  Activities as tolerated    For further questions contact hospitalist office    Discharge time 33 minutes    For worsening symptoms, chest pain, shortness of breath, increased abdominal pain, high grade fever, stroke or stroke like symptoms, immediately go to the nearest Emergency Room or call 911 as soon as possible.      Maurice Cotter M.D, on 6/30/2025. at 9:29 AM.

## 2025-06-30 NOTE — PLAN OF CARE
Consult for  noted. [facility]. [facility] #[phone].     11:50am-confirmed with Phyllis at [facility] that she received paperwork. She is working on his chart right now and she will call me once it's been setup.

## 2025-06-30 NOTE — NURSING
DC note: DC instructions/wound care instructions given to patient and spouse. All questions answered. NAD noted. Patient had PCP f/u planned - instructed to keep scheduled appointment. Instructed on when to contact MD. Home health to follow.

## 2025-06-30 NOTE — DISCHARGE INSTRUCTIONS
Wound care:     Cleanse with vashe, pat dry, apply aquacel ag, cover with abd pad, change daily and as needed for soilage.

## 2025-06-30 NOTE — PLAN OF CARE
06/30/25 1349   Final Note   Assessment Type Final Discharge Note   Anticipated Discharge Disposition Home-Health   What phone number can be called within the next 1-3 days to see how you are doing after discharge? [phone]   Post-Acute Status   Post-Acute Authorization Home Health   Home Health Status Set-up Complete/Auth obtained   Discharge Delays None known at this time     Pt being discharged home today with home health. Per [facility] at [facility], authorization for HH has been approved. She will forward the authorization to her HH dept and they will contact the pt directly to set up services with a HH agency. Nothing further needed by me per Phyllis. Pt and family notified. Verbalize understanding.

## 2025-06-30 NOTE — PLAN OF CARE
Problem: Adult Inpatient Plan of Care  Goal: Plan of Care Review  6/30/2025 1427 by Argenis Lopez RN  Outcome: Met  6/30/2025 1241 by Argenis Lopez RN  Outcome: Progressing  Goal: Patient-Specific Goal (Individualized)  6/30/2025 1427 by Argenis Lopez RN  Outcome: Met  6/30/2025 1241 by Argenis Lopez RN  Outcome: Progressing  Goal: Absence of Hospital-Acquired Illness or Injury  6/30/2025 1427 by Argenis Lopez RN  Outcome: Met  6/30/2025 1241 by Argenis Lopez RN  Outcome: Progressing  Goal: Optimal Comfort and Wellbeing  6/30/2025 1427 by Argenis Lopez RN  Outcome: Met  6/30/2025 1241 by Argenis Lopez RN  Outcome: Progressing  Goal: Readiness for Transition of Care  6/30/2025 1427 by Argenis Lopez RN  Outcome: Met  6/30/2025 1241 by Argenis Lopez RN  Outcome: Progressing     Problem: Wound  Goal: Optimal Coping  6/30/2025 1427 by Argenis Lopez RN  Outcome: Met  6/30/2025 1241 by Argenis Lopez RN  Outcome: Progressing  Goal: Optimal Functional Ability  6/30/2025 1427 by Argenis Lopez RN  Outcome: Met  6/30/2025 1241 by Argenis Lopez RN  Outcome: Progressing  Goal: Absence of Infection Signs and Symptoms  6/30/2025 1427 by Argenis Lopez RN  Outcome: Met  6/30/2025 1241 by Argenis Lopez RN  Outcome: Progressing  Goal: Improved Oral Intake  6/30/2025 1427 by Argenis oLpez RN  Outcome: Met  6/30/2025 1241 by Argenis Lopez RN  Outcome: Progressing  Goal: Optimal Pain Control and Function  6/30/2025 1427 by Argenis Lopez RN  Outcome: Met  6/30/2025 1241 by Argenis Lopez RN  Outcome: Progressing  Goal: Skin Health and Integrity  6/30/2025 1427 by Argenis Lopez RN  Outcome: Met  6/30/2025 1241 by Argenis Lopez RN  Outcome: Progressing  Goal: Optimal Wound Healing  6/30/2025 1427 by Argenis Lopez RN  Outcome: Met  6/30/2025 1241 by Argenis Lopez, RN  Outcome: Progressing

## 2025-07-01 LAB — BACTERIA WND CULT: NO GROWTH

## 2025-07-02 LAB
BACTERIA BLD CULT: NORMAL
BACTERIA BLD CULT: NORMAL

## 2025-07-03 NOTE — PLAN OF CARE
7/3/25 @ 10:30-received a call from Caitie Eaton [facility] primary care physicians. She said pt was never contacted by a [facility] agency as [facility]. Gave her the number for Phyllis at [facility] at her request. Told her to call back if she needs any more assistance.